# Patient Record
Sex: FEMALE | Race: WHITE | Employment: OTHER | URBAN - METROPOLITAN AREA
[De-identification: names, ages, dates, MRNs, and addresses within clinical notes are randomized per-mention and may not be internally consistent; named-entity substitution may affect disease eponyms.]

---

## 2018-10-01 ENCOUNTER — HOSPITAL ENCOUNTER (OUTPATIENT)
Dept: MAMMOGRAPHY | Age: 68
Discharge: HOME | End: 2018-10-01
Attending: INTERNAL MEDICINE
Payer: MEDICARE

## 2018-10-01 DIAGNOSIS — M81.0 OSTEOPOROSIS, POST-MENOPAUSAL: ICD-10-CM

## 2018-10-01 PROCEDURE — 77080 DXA BONE DENSITY AXIAL: CPT

## 2020-10-02 ENCOUNTER — HOSPITAL ENCOUNTER (OUTPATIENT)
Dept: MAMMOGRAPHY | Age: 70
Discharge: HOME | End: 2020-10-02
Attending: INTERNAL MEDICINE
Payer: MEDICARE

## 2020-10-02 DIAGNOSIS — M85.89 OSTEOPENIA OF MULTIPLE SITES: ICD-10-CM

## 2020-10-02 PROCEDURE — 77080 DXA BONE DENSITY AXIAL: CPT

## 2021-01-28 ENCOUNTER — APPOINTMENT (RX ONLY)
Dept: URBAN - METROPOLITAN AREA CLINIC 23 | Facility: CLINIC | Age: 71
Setting detail: DERMATOLOGY
End: 2021-01-28

## 2021-01-28 DIAGNOSIS — L57.8 OTHER SKIN CHANGES DUE TO CHRONIC EXPOSURE TO NONIONIZING RADIATION: ICD-10-CM

## 2021-01-28 DIAGNOSIS — L30.0 NUMMULAR DERMATITIS: ICD-10-CM | Status: WELL CONTROLLED

## 2021-01-28 DIAGNOSIS — D22 MELANOCYTIC NEVI: ICD-10-CM

## 2021-01-28 DIAGNOSIS — L57.0 ACTINIC KERATOSIS: ICD-10-CM

## 2021-01-28 DIAGNOSIS — L82.0 INFLAMED SEBORRHEIC KERATOSIS: ICD-10-CM

## 2021-01-28 PROBLEM — D22.5 MELANOCYTIC NEVI OF TRUNK: Status: ACTIVE | Noted: 2021-01-28

## 2021-01-28 PROBLEM — D22.39 MELANOCYTIC NEVI OF OTHER PARTS OF FACE: Status: ACTIVE | Noted: 2021-01-28

## 2021-01-28 PROCEDURE — ? OTHER

## 2021-01-28 PROCEDURE — 17000 DESTRUCT PREMALG LESION: CPT | Mod: 59

## 2021-01-28 PROCEDURE — ? COUNSELING

## 2021-01-28 PROCEDURE — 17110 DESTRUCTION B9 LES UP TO 14: CPT

## 2021-01-28 PROCEDURE — ? LIQUID NITROGEN

## 2021-01-28 PROCEDURE — 99203 OFFICE O/P NEW LOW 30 MIN: CPT | Mod: 25

## 2021-01-28 ASSESSMENT — LOCATION SIMPLE DESCRIPTION DERM
LOCATION SIMPLE: CHEST
LOCATION SIMPLE: ABDOMEN
LOCATION SIMPLE: RIGHT CHEEK
LOCATION SIMPLE: LEFT TEMPLE
LOCATION SIMPLE: LEFT UPPER BACK
LOCATION SIMPLE: RIGHT UPPER BACK
LOCATION SIMPLE: LEFT FOREARM
LOCATION SIMPLE: RIGHT ELBOW

## 2021-01-28 ASSESSMENT — LOCATION DETAILED DESCRIPTION DERM
LOCATION DETAILED: MIDDLE STERNUM
LOCATION DETAILED: PERIUMBILICAL SKIN
LOCATION DETAILED: LEFT PROXIMAL DORSAL FOREARM
LOCATION DETAILED: RIGHT SUPERIOR CENTRAL MALAR CHEEK
LOCATION DETAILED: RIGHT ELBOW
LOCATION DETAILED: RIGHT CLAVICULAR SKIN
LOCATION DETAILED: RIGHT CHEEK
LOCATION DETAILED: LEFT MEDIAL TEMPLE
LOCATION DETAILED: RIGHT MID-UPPER BACK
LOCATION DETAILED: LEFT SUPERIOR MEDIAL UPPER BACK

## 2021-01-28 ASSESSMENT — LOCATION ZONE DERM
LOCATION ZONE: FACE
LOCATION ZONE: TRUNK
LOCATION ZONE: ARM

## 2021-01-28 NOTE — PROCEDURE: LIQUID NITROGEN
Detail Level: Detailed
Medical Necessity Clause: This procedure was medically necessary because the lesions that were treated were:irritated
Medical Necessity Information: It is in your best interest to select a reason for this procedure from the list below. All of these items fulfill various CMS LCD requirements except the new and changing color options.
Duration Of Freeze Thaw-Cycle (Seconds): 0
Render Post-Care Instructions In Note?: no
Post-Care Instructions: I reviewed with the patient in detail post-care instructions. Patient is to wear sunprotection, and avoid picking at any of the treated lesions. Pt may apply Vaseline to crusted or scabbing areas.
Consent: The patient's consent was obtained including but not limited to risks of crusting, scabbing, blistering, scarring, darker or lighter pigmentary change, recurrence, incomplete removal and infection.

## 2021-01-28 NOTE — PROCEDURE: OTHER
Note Text (......Xxx Chief Complaint.): This diagnosis correlates with the
Other (Free Text): No flares at this time. No prescription needed
Detail Level: Simple
Render Risk Assessment In Note?: no

## 2021-09-23 ENCOUNTER — APPOINTMENT (RX ONLY)
Dept: URBAN - METROPOLITAN AREA CLINIC 23 | Facility: CLINIC | Age: 71
Setting detail: DERMATOLOGY
End: 2021-09-23

## 2021-09-23 DIAGNOSIS — L82.0 INFLAMED SEBORRHEIC KERATOSIS: ICD-10-CM

## 2021-09-23 PROCEDURE — 17110 DESTRUCTION B9 LES UP TO 14: CPT

## 2021-09-23 PROCEDURE — ? COUNSELING

## 2021-09-23 PROCEDURE — ? LIQUID NITROGEN

## 2021-09-23 ASSESSMENT — LOCATION ZONE DERM: LOCATION ZONE: TRUNK

## 2021-09-23 ASSESSMENT — LOCATION DETAILED DESCRIPTION DERM: LOCATION DETAILED: SUPERIOR THORACIC SPINE

## 2021-09-23 ASSESSMENT — LOCATION SIMPLE DESCRIPTION DERM: LOCATION SIMPLE: UPPER BACK

## 2021-09-23 NOTE — PROCEDURE: LIQUID NITROGEN
Add 52 Modifier (Optional): no
Consent: The patient's consent was obtained including but not limited to risks of crusting, scabbing, blistering, scarring, darker or lighter pigmentary change, recurrence, incomplete removal and infection.
Show Topical Anesthesia Variable?: Yes
Detail Level: Detailed
Post-Care Instructions: I reviewed with the patient in detail post-care instructions. Patient is to wear sunprotection, and avoid picking at any of the treated lesions. Pt may apply Vaseline to crusted or scabbing areas.
Medical Necessity Clause: This procedure was medically necessary because the lesions that were treated were:irritated
Medical Necessity Information: It is in your best interest to select a reason for this procedure from the list below. All of these items fulfill various CMS LCD requirements except the new and changing color options.

## 2022-09-15 ENCOUNTER — APPOINTMENT (RX ONLY)
Dept: URBAN - METROPOLITAN AREA CLINIC 23 | Facility: CLINIC | Age: 72
Setting detail: DERMATOLOGY
End: 2022-09-15

## 2022-09-15 DIAGNOSIS — L82.0 INFLAMED SEBORRHEIC KERATOSIS: ICD-10-CM

## 2022-09-15 DIAGNOSIS — L82.1 OTHER SEBORRHEIC KERATOSIS: ICD-10-CM

## 2022-09-15 PROCEDURE — 11302 SHAVE SKIN LESION 1.1-2.0 CM: CPT

## 2022-09-15 PROCEDURE — ? SHAVE REMOVAL

## 2022-09-15 PROCEDURE — ? COUNSELING

## 2022-09-15 PROCEDURE — 99212 OFFICE O/P EST SF 10 MIN: CPT | Mod: 25

## 2022-09-15 ASSESSMENT — LOCATION DETAILED DESCRIPTION DERM
LOCATION DETAILED: MIDDLE STERNUM
LOCATION DETAILED: UPPER STERNUM

## 2022-09-15 ASSESSMENT — LOCATION ZONE DERM: LOCATION ZONE: TRUNK

## 2022-09-15 ASSESSMENT — LOCATION SIMPLE DESCRIPTION DERM: LOCATION SIMPLE: CHEST

## 2022-09-15 NOTE — PROCEDURE: SHAVE REMOVAL
Render Post-Care Instructions In Note?: no
Was A Bandage Applied: Yes
Notification Instructions: Patient will be notified of pathology results. However, patient instructed to call the office if not contacted within 2 weeks.
Anesthesia Type: 1% lidocaine with epinephrine
Medical Necessity Clause: This procedure was medically necessary because the lesion that was treated was:
Consent was obtained from the patient. The risks and benefits to therapy were discussed in detail. Specifically, the risks of infection, scarring, bleeding, prolonged wound healing, incomplete removal, allergy to anesthesia, nerve injury and recurrence were addressed. Prior to the procedure, the treatment site was clearly identified and confirmed by the patient. All components of Universal Protocol/PAUSE Rule completed.
X Size Of Lesion In Cm (Optional): 0
Wound Care: Petrolatum
Detail Level: Detailed
Post-Care Instructions: I reviewed with the patient in detail post-care instructions. Patient is to keep the biopsy site dry overnight, and then apply bacitracin twice daily until healed. Patient may apply hydrogen peroxide soaks to remove any crusting.
Billing Type: Third-Party Bill
Hemostasis: Drysol
Biopsy Method: Dermablade
Size Of Lesion In Cm (Required): 1.3
Accession #: [id number]
Medical Necessity Information: It is in your best interest to select a reason for this procedure from the list below. All of these items fulfill various CMS LCD requirements except the new and changing color options.

## 2022-10-10 ENCOUNTER — OFFICE VISIT (OUTPATIENT)
Dept: RHEUMATOLOGY | Age: 72
End: 2022-10-10
Payer: MEDICARE

## 2022-10-10 VITALS
DIASTOLIC BLOOD PRESSURE: 69 MMHG | SYSTOLIC BLOOD PRESSURE: 154 MMHG | HEART RATE: 77 BPM | HEIGHT: 62 IN | BODY MASS INDEX: 27.42 KG/M2 | WEIGHT: 149 LBS

## 2022-10-10 DIAGNOSIS — M81.0 AGE-RELATED OSTEOPOROSIS WITHOUT CURRENT PATHOLOGICAL FRACTURE: Primary | ICD-10-CM

## 2022-10-10 DIAGNOSIS — Z79.899 LONG-TERM USE OF HIGH-RISK MEDICATION: ICD-10-CM

## 2022-10-10 PROCEDURE — G8484 FLU IMMUNIZE NO ADMIN: HCPCS | Performed by: INTERNAL MEDICINE

## 2022-10-10 PROCEDURE — 1036F TOBACCO NON-USER: CPT | Performed by: INTERNAL MEDICINE

## 2022-10-10 PROCEDURE — 3017F COLORECTAL CA SCREEN DOC REV: CPT | Performed by: INTERNAL MEDICINE

## 2022-10-10 PROCEDURE — G8427 DOCREV CUR MEDS BY ELIG CLIN: HCPCS | Performed by: INTERNAL MEDICINE

## 2022-10-10 PROCEDURE — G8399 PT W/DXA RESULTS DOCUMENT: HCPCS | Performed by: INTERNAL MEDICINE

## 2022-10-10 PROCEDURE — 99214 OFFICE O/P EST MOD 30 MIN: CPT | Performed by: INTERNAL MEDICINE

## 2022-10-10 PROCEDURE — 1123F ACP DISCUSS/DSCN MKR DOCD: CPT | Performed by: INTERNAL MEDICINE

## 2022-10-10 PROCEDURE — 1090F PRES/ABSN URINE INCON ASSESS: CPT | Performed by: INTERNAL MEDICINE

## 2022-10-10 PROCEDURE — G8417 CALC BMI ABV UP PARAM F/U: HCPCS | Performed by: INTERNAL MEDICINE

## 2022-10-10 ASSESSMENT — PATIENT HEALTH QUESTIONNAIRE - PHQ9
SUM OF ALL RESPONSES TO PHQ QUESTIONS 1-9: 0
2. FEELING DOWN, DEPRESSED OR HOPELESS: 0
SUM OF ALL RESPONSES TO PHQ QUESTIONS 1-9: 0
SUM OF ALL RESPONSES TO PHQ QUESTIONS 1-9: 0
1. LITTLE INTEREST OR PLEASURE IN DOING THINGS: 0
SUM OF ALL RESPONSES TO PHQ QUESTIONS 1-9: 0
SUM OF ALL RESPONSES TO PHQ9 QUESTIONS 1 & 2: 0

## 2022-10-10 ASSESSMENT — ROUTINE ASSESSMENT OF PATIENT INDEX DATA (RAPID3)
ON A SCALE OF ONE TO TEN, HOW MUCH OF A PROBLEM HAS UNUSUAL FATIGUE OR TIREDNESS BEEN FOR YOU OVER THE PAST WEEK?: 0
ON A SCALE OF ONE TO TEN, HOW DIFFICULT WAS IT FOR YOU TO COMPLETE THE LISTED DAILY PHYSICAL TASKS OVER THE LAST WEEK: 0.1
ON A SCALE OF ONE TO TEN, HOW MUCH PAIN HAVE YOU HAD BECAUSE OF YOUR CONDITION OVER THE PAST WEEK?: 0
ON A SCALE OF ONE TO TEN, CONSIDERING ALL THE WAYS IN WHICH ILLNESS AND HEALTH CONDITIONS MAY AFFECT YOU AT THIS TIME, PLEASE INDICATE BELOW HOW YOU ARE DOING:: 2

## 2022-10-10 NOTE — Clinical Note
Patient just had lab work done last week. I reviewed the labs with the patient today. Please schedule her for her next Prolia shot on or after October 20. She will be going for a DEXA scan as well since she is due for it, but either way she needs to get her Prolia shot even if she has not had her DEXA scan yet.

## 2022-10-10 NOTE — PROGRESS NOTES
Anthony Ribera M.D.  [address] [place] [address], [address] [facility]  Office : [phone], Fax: [phone] OFFICE VISIT NOTE  Date of Visit:  10/10/2022 8:43 AM    Patient Information:  Name:  Lucy Zamudio  :  1950  Age:  67 y.o. Gender:  female      Ms. Enrique Us is here today for follow-up of osteoporosis. Last visit: 22      History of Present Illness: On talking to the patient today she states that since she last saw me she has not had any cough or congestion with no fever or chills. She has had minimal joint pain as mentioned below. Since the last visit, patient is feeling \"good\". Pain: 1/10  Location: Some lower back pain. Bilateral shoulder pain. Occasional buckling of the left knee. Quality:  Deep achy pain. Modifying Factors:  Bending forwards worsens the lower back pain. Associated Symptoms:  No tingling, numbness or pain down the arms or legs. DMARD/Biologic 10/10/2022   AM Stiffness None   Pain 0   Fatigue 0   MDHAQ 0.1   Patient Global Score 2   Medication Name Other   Other Medication prolia     Last TB screen: NA  TB result: NA     Current dose of steroids: none  How long on current dose of steroids: NA  How long on continuous steroid therapy: NA     Past DMARDs, if applicable (methotrexate, plaquenil/hydroxychloroquine, sulfasalazine, Arava/leflunomide):none     Past biologics, if applicable (enbrel, humira, simponi, cimzia, xeljanz, orencia, remicade, simponi aria, actemra, rituximab, Karin Kirby, stelara, cosentyx):none     Past NSAIDs, if applicable (motrin, aleve, naproxen, advil, ibuprofen, celebrex, voltaren/diclofenac, etc.):Tylenol prn        Last BMD:10/02/2020   Past osteoporosis drugs, if applicable (fosamax, actonel, boniva, reclast, prolia, forteo): To date, she has had Reclast in , ,  then went on a drug holiday in . You had Reclast #4 on 10/10/16 and Reclast #5 on 17.  Went on Reclast drug holiday from Oct. 2018- Oct. 2020. Currently on prolia - last dose 22. BMI: 27.47  Current exercise regimen, if any: starting back walking , walks a little daily some days more than other. Current vitamin D dose: 3400 international units daily in Vit D+ Multivitamin daily. Current calcium dose: Calcium +D (600 mg once a day and 600 mg in her diet). Fractures since last visit, if any: None     The patient otherwise has no significant interval changes in health or medical history to report. History Reviewed:    Past Medical History  Past Medical History:   Diagnosis Date    Diverticulitis     GERD (gastroesophageal reflux disease)     GERD (gastroesophageal reflux disease) 2013    Osteoporosis 2013    Thyroid disease     hypothroidism       Past Surgical History  Past Surgical History:   Procedure Laterality Date    HEENT      sinus surgery x 2    HYSTERECTOMY (CERVIX STATUS UNKNOWN)         Family History  Family History   Problem Relation Age of Onset    Stroke Mother     Hypertension Father     Diabetes Father     Diabetes Sister     Diabetes Sister     Cancer Mother        Social History  Social History     Socioeconomic History    Marital status:      Spouse name: None    Number of children: None    Years of education: None    Highest education level: None   Tobacco Use    Smoking status: Former     Packs/day: 0.75     Types: Cigarettes     Start date: 1968     Quit date: 1971     Years since quittin.8    Smokeless tobacco: Never   Substance and Sexual Activity    Alcohol use: Yes    Drug use:  No               Allergy:  Allergies   Allergen Reactions    Amoxicillin-Pot Clavulanate Diarrhea    Ciprofloxacin Diarrhea and Rash         Current Medications:  Outpatient Encounter Medications as of 10/10/2022   Medication Sig Dispense Refill    atorvastatin (LIPITOR) 20 MG tablet Take by mouth daily      Cholecalciferol 50 MCG (2000) CAPS Take 1 capsule by mouth daily      denosumab (PROLIA) 60 MG/ML SOSY SC injection Inject 60 mg into the skin      esomeprazole (NEXIUM) 40 MG delayed release capsule Take 40 mg by mouth daily      fexofenadine (ALLEGRA) 180 MG tablet Take by mouth daily      levothyroxine (SYNTHROID) 50 MCG tablet Take 50 mcg by mouth every other day      levothyroxine (SYNTHROID) 75 MCG tablet Take 75 mcg by mouth every other day      Probiotic Product (ACIDOPHILUS PROBIOTIC) CAPS capsule Take 1 capsule by mouth daily      SUMAtriptan (IMITREX) 100 MG tablet Take 100 mg by mouth once as needed      topiramate (TOPAMAX) 100 MG tablet Take 1.5 bid       No facility-administered encounter medications on file as of 10/10/2022. REVIEW OF SYSTEMS: The following systems were reviewed with patient today and were negative except for the following (depicted with an \"X\"):        \"X\" General  \"X\" Head and Neck  \"X\" Heart and Breathing  \"X\" Gastrointestinal    Fever/chills   Hair loss   Shortness of breath   Upset stomach    Falls   Dry mouth   Coughing   Diarrhea / constipation    Wt loss   Mouth sores   Wheezing   Heartburn    Wt gain   Ringing ears   Chest pain   Dark or bloody stools    Night sweats   Diff. swallowing  X None of above   Nausea or vomiting   X None of above  X None of above     X None of above                \"X\" Skin  \"X\" Neurology  \"X\" Urinary/Gyn  \"X\" Other    Easy bruising   Numbness/ tingling   Female problems   Depression    Rashes   Weakness   Problems with urination   Feeling anxious    Sun sensitivity   Headaches  X None of above   Problems sleeping   X None of above  X None of above     X None of above          Physical Exam:  Blood pressure (!) 154/69, pulse 77, height 5' 2\" (1.575 m), weight 149 lb (67.6 kg). General:  Patient alert, cooperative and in no apparent distress. HEENT: Pupils equally reactive to light and accommodation, minimal scleral injection noted.   Heart: Regular rate and rhythm, normal S1 and S2, no rubs or gallops. Lungs: Clear to auscultation bilaterally. Abdomen: Soft, nontender, no hepatosplenomegaly. Skin:  No rashes. No nail abnormalities. Neurologic:  Oriented, normal speech and affect. Normal gait. Extremities:  No edema in bilateral lower extremities with no cyanosis or clubbing. Muskoskeletal Exam:     I examined the shoulders, elbows, wrists, MCPs, PIPs, DIPs and knees bilaterally for strength, range of motion, deformity, tenderness, swelling, and synovitis. The findings are:  No joint tenderness with no synovitis of the 2nd to 5th MCP, PIP or DIP joints. No synovitis with no tenderness of the wrists on the dorsum with no warmth or redness. Intact ROM of the wrists to flexion and extension. No tenderness of the elbows with no swelling, warmth or redness with intact ROM to flexion and extension. No tenderness of the shoulders anteriorly or superiorly with intact ROM to abduction and internal rotation. No tenderness of the C spine with no tenderness of the para spinal muscles of the neck. No tenderness of the T and L spine with no SI joint tenderness. No mid joint line tenderness of the knees with no swelling, warmth or redness. No tenderness with no synovitis of the ankles with no warmth or redness. No metatarsalgia with no synovitis of the 1st to 5th MTP joints of the feet with no warmth or redness. Patient otherwise has a normal joint exam without other evidence of joint tenderness, synovitis, warmth, erythema, decreased ROM, weakness or deformities. Radiology Reports Reviewed (if available):  Last 3 months  [unfilled]    Lab Reports Reviewed (if available): Last 3 months    No visits with results within 3 Month(s) from this visit.    Latest known visit with results is:   Office Visit on 04/06/2021   Component Date Value Ref Range Status    Glucose 04/06/2021 93  65 - 99 mg/dL Final    BUN 04/06/2021 14  8 - 27 mg/dL Final    Creatinine 04/06/2021 0.96  0.57 - 1.00 mg/dL Final    EGFR IF NonAfrican American 04/06/2021 60  >59 mL/min/1.73 Final    GFR  04/06/2021 69  >59 mL/min/1.73 Final    Bun/Cre Ratio 04/06/2021 15  12 - 28 NA Final    Sodium 04/06/2021 144  134 - 144 mmol/L Final    Potassium 04/06/2021 4.0  3.5 - 5.2 mmol/L Final    Chloride 04/06/2021 111 (A)  96 - 106 mmol/L Final    CO2 04/06/2021 19 (A)  20 - 29 mmol/L Final    Calcium 04/06/2021 9.1  8.7 - 10.3 mg/dL Final    Total Protein 04/06/2021 6.4  6.0 - 8.5 g/dL Final    Albumin 04/06/2021 4.3  3.7 - 4.7 g/dL Final    Globulin, Total 04/06/2021 2.1  1.5 - 4.5 g/dL Final    Albumin/Globulin Ratio 04/06/2021 2.0  1.2 - 2.2 NA Final    Total Bilirubin 04/06/2021 0.3  0.0 - 1.2 mg/dL Final    Alkaline Phosphatase 04/06/2021 62  39 - 117 IU/L Final    AST 04/06/2021 20  0 - 40 IU/L Final    ALT 04/06/2021 25  0 - 32 IU/L Final         The results above were reviewed and discussed with patient. Assessment/Plan:   Komal Rangel is a 67 y.o. female who presents with:     Age-related osteoporosis without current pathological fracture:  Since her last DEXA scan was done 10/2/2020 I did put in an order for her to have a repeat DEXA scan before she sees me back in 6 months time. We will get her in for her next Prolia shot on or after October 20 since her last Prolia shot was 04/20/2022. She was instructed to continue calcium 600 mg once a day in the supplement form and 600 mg in her diet while continuing vitamin D 3400 IUs daily along with a multivitamin daily.  -     DEXA BONE DENSITY AXIAL SKELETON; Future     Long-term use of high-risk medication: Since patient did have a vitamin D 25-hydroxy level as well as a BMP and LFT's checked on 10/7/2022 I did not feel the need to repeat those labs today. Disease activity plan:  As stated above. Steroid management plan:  As stated above, if applicable. Pain management plan:  As stated above, if applicable.     Weight management plan:  Weight loss through diet and exercise is always encouraged    Disease prognosis: Good    I appreciate the opportunity to continue to participate in the care of this patient. Follow-up and Dispositions    Return in about 22 weeks (around 3/13/2023). Electronically signed by:  Mariela Cuevas MD      This note was dictated using dragon voice recognition software.   It has been proofread, but there may still exist voice recognition errors that the author did not detect.                --------------------------------------------------------------------------------------------------------------------------------------------------------------------------------------------------------------------------------

## 2022-10-12 ENCOUNTER — TELEPHONE (OUTPATIENT)
Dept: RHEUMATOLOGY | Age: 72
End: 2022-10-12

## 2022-10-13 NOTE — TELEPHONE ENCOUNTER
----- Message from Gilma Malagon MA sent at 10/10/2022  9:12 AM EDT -----  Regarding: MD Gilma Molina MA    Patient just had lab work done last week. Laura Pagan reviewed the labs with the patient today.  Please schedule her for her next Prolia shot on or after October 20.  She will be going for a DEXA scan as well since she is due for it, but either way she needs to get her Prolia shot even if she has not had her DEXA scan yet.

## 2022-10-13 NOTE — TELEPHONE ENCOUNTER
Prolia BIF submitted 10/12/22. MCR and [employer] MCR supp plan D.     Labs 10/7/22. Vit D= 66  Calcium= 8.9  Creatnine 1.11, GFR 52. Patient due for Prolia 10/20/22.

## 2022-10-13 NOTE — TELEPHONE ENCOUNTER
PHONE CALL to patient. Scheduled her for 10/24/22 10:00.      Lab Results   Component Value Date     04/06/2021    K 4.0 04/06/2021     (H) 04/06/2021    CO2 19 (L) 04/06/2021    BUN 14 04/06/2021    CREATININE 0.96 04/06/2021    GLUCOSE 93 04/06/2021    CALCIUM 9.1 04/06/2021    PROT 6.4 04/06/2021    LABALBU 4.3 04/06/2021    BILITOT 0.3 04/06/2021    ALKPHOS 62 04/06/2021    AST 20 04/06/2021    ALT 25 04/06/2021    GFRAA 69 04/06/2021    AGRATIO 2.0 04/06/2021     Vitamin D 10/7/22 = 66

## 2022-10-27 ENCOUNTER — HOSPITAL ENCOUNTER (OUTPATIENT)
Dept: MAMMOGRAPHY | Age: 72
Discharge: HOME | End: 2022-10-30
Payer: MEDICARE

## 2022-10-27 ENCOUNTER — NURSE ONLY (OUTPATIENT)
Dept: RHEUMATOLOGY | Age: 72
End: 2022-10-27
Payer: MEDICARE

## 2022-10-27 VITALS — HEART RATE: 60 BPM | SYSTOLIC BLOOD PRESSURE: 124 MMHG | TEMPERATURE: 97 F | DIASTOLIC BLOOD PRESSURE: 63 MMHG

## 2022-10-27 DIAGNOSIS — M81.0 AGE-RELATED OSTEOPOROSIS WITHOUT CURRENT PATHOLOGICAL FRACTURE: ICD-10-CM

## 2022-10-27 DIAGNOSIS — M81.0 AGE-RELATED OSTEOPOROSIS WITHOUT CURRENT PATHOLOGICAL FRACTURE: Primary | ICD-10-CM

## 2022-10-27 PROCEDURE — 77080 DXA BONE DENSITY AXIAL: CPT

## 2022-10-27 PROCEDURE — 96372 THER/PROPH/DIAG INJ SC/IM: CPT | Performed by: INTERNAL MEDICINE

## 2022-10-27 NOTE — PROGRESS NOTES
[address] [address], [address]  Office : [phone], Fax: [phone]       # 5 Prolia 60mg/ml injected SC today into left upper arm. Patient tolerated injection well. Advised patient to continue with calcium and vitamin D post injection. Advised patient to call with any problems post injection. Medication ordered by Dr. Errol Davis. Pre-infusion/injection questionairre for osteoporosis    1. Have you had or are you planning any dental work? NO    2. Are you taking your calcium and vitamin D? YES    3. When was your last osteoporosis treatment? 4/20/22    4. Have you had any recent fractures?  NO

## 2023-02-27 RX ORDER — TOPIRAMATE 100 MG/1
TABLET, FILM COATED ORAL
Qty: 270 TABLET | Refills: 3 | OUTPATIENT
Start: 2023-02-27

## 2023-03-08 ENCOUNTER — OFFICE VISIT (OUTPATIENT)
Dept: NEUROLOGY | Age: 73
End: 2023-03-08
Payer: MEDICARE

## 2023-03-08 VITALS
DIASTOLIC BLOOD PRESSURE: 78 MMHG | BODY MASS INDEX: 27.42 KG/M2 | WEIGHT: 149 LBS | SYSTOLIC BLOOD PRESSURE: 132 MMHG | OXYGEN SATURATION: 100 % | HEART RATE: 65 BPM | HEIGHT: 62 IN

## 2023-03-08 DIAGNOSIS — G43.709 CHRONIC MIGRAINE WITHOUT AURA WITHOUT STATUS MIGRAINOSUS, NOT INTRACTABLE: Primary | ICD-10-CM

## 2023-03-08 PROCEDURE — G8417 CALC BMI ABV UP PARAM F/U: HCPCS | Performed by: PSYCHIATRY & NEUROLOGY

## 2023-03-08 PROCEDURE — G8399 PT W/DXA RESULTS DOCUMENT: HCPCS | Performed by: PSYCHIATRY & NEUROLOGY

## 2023-03-08 PROCEDURE — 1090F PRES/ABSN URINE INCON ASSESS: CPT | Performed by: PSYCHIATRY & NEUROLOGY

## 2023-03-08 PROCEDURE — G8427 DOCREV CUR MEDS BY ELIG CLIN: HCPCS | Performed by: PSYCHIATRY & NEUROLOGY

## 2023-03-08 PROCEDURE — 1123F ACP DISCUSS/DSCN MKR DOCD: CPT | Performed by: PSYCHIATRY & NEUROLOGY

## 2023-03-08 PROCEDURE — G8484 FLU IMMUNIZE NO ADMIN: HCPCS | Performed by: PSYCHIATRY & NEUROLOGY

## 2023-03-08 PROCEDURE — 1036F TOBACCO NON-USER: CPT | Performed by: PSYCHIATRY & NEUROLOGY

## 2023-03-08 PROCEDURE — 99214 OFFICE O/P EST MOD 30 MIN: CPT | Performed by: PSYCHIATRY & NEUROLOGY

## 2023-03-08 PROCEDURE — 3017F COLORECTAL CA SCREEN DOC REV: CPT | Performed by: PSYCHIATRY & NEUROLOGY

## 2023-03-08 RX ORDER — CALCIUM CARBONATE/VITAMIN D3 600 MG-10
1 TABLET ORAL 2 TIMES DAILY
COMMUNITY
Start: 2012-11-08

## 2023-03-08 RX ORDER — TOPIRAMATE 100 MG/1
TABLET, FILM COATED ORAL
Qty: 270 TABLET | Refills: 3 | Status: SHIPPED | OUTPATIENT
Start: 2023-03-08

## 2023-03-08 RX ORDER — CYCLOSPORINE 0.5 MG/ML
EMULSION OPHTHALMIC
COMMUNITY
Start: 2022-12-02

## 2023-03-08 RX ORDER — SUMATRIPTAN 100 MG/1
TABLET, FILM COATED ORAL
Qty: 27 TABLET | Refills: 3 | Status: SHIPPED | OUTPATIENT
Start: 2023-03-08

## 2023-03-08 ASSESSMENT — ENCOUNTER SYMPTOMS
EYES NEGATIVE: 1
ALLERGIC/IMMUNOLOGIC NEGATIVE: 1
RESPIRATORY NEGATIVE: 1
GASTROINTESTINAL NEGATIVE: 1

## 2023-03-08 NOTE — PROGRESS NOTES
[facility], [address] [place] [address], [address] [place] [address]  Phone: [phone] Fax [phone]  Dr. Randye Nyhan      3/8/2023  Radha Cramer     Patient is referred by the following provider for consultation regarding as below:       I reviewed the available records and notes and have examined patient with the following findings:     Chief Complaint:  Chief Complaint   Patient presents with    Follow-up     Chronic migraine without aura, not intractable, without status migrainosus          HPI: This is a right handed 67 y.o.  female who is very pleasant very appropriate patient unfortunately does suffer from chronic migraine headaches. Her headaches actually improved dramatically once unfortunately her  passed away of cancer because she was under an enormous amount of stress at that time tried to take care of him so when the stress level decreased her headaches improved. She had a very good year but recovered get the allergies this season that makes it usually little worse. But she has not had any overly severe migraine headaches in a year. When she does get them there is pressure pounding throbbing photophobia phonophobia nausea but no vomiting. Usually in the frontal head regions. She has not noticed any memory or cognition or side effects from the Topamax but we have been watching her closely. She is doing well overall when she does take the Imitrex 100 mg tablets she actually breaks it into1/4 or half tablets when she takes it. And then she will take about 3 or 4 different in a row. Equaling up to 1 full tablet. IMAGING REVIEW:  I REVIEWED PERTINENT  IMAGES AND REPORTS WITH THE PATIENT PERSONALLY, DIRECTLY AND FULLY.      Past Medical History:  Past Medical History:   Diagnosis Date    Diverticulitis     GERD (gastroesophageal reflux disease)     GERD (gastroesophageal reflux disease) 8/1/2013    Osteoporosis 8/1/2013    Thyroid disease     hypothroidism       Past Surgical History:  Past Surgical History:   Procedure Laterality Date    HEENT      sinus surgery x 2    HYSTERECTOMY (CERVIX STATUS UNKNOWN)         Social History:  Social History     Socioeconomic History    Marital status:       Spouse name: Not on file    Number of children: Not on file    Years of education: Not on file    Highest education level: Not on file   Occupational History    Not on file   Tobacco Use    Smoking status: Former     Packs/day: 0.75     Types: Cigarettes     Start date: 1968     Quit date: 1971     Years since quittin.2    Smokeless tobacco: Never   Substance and Sexual Activity    Alcohol use: Yes    Drug use: No    Sexual activity: Not on file   Other Topics Concern    Not on file   Social History Narrative    Not on file     Social Determinants of Health     Financial Resource Strain: Not on file   Food Insecurity: Not on file   Transportation Needs: Not on file   Physical Activity: Not on file   Stress: Not on file   Social Connections: Not on file   Intimate Partner Violence: Not on file   Housing Stability: Not on file       Family History:   Family History   Problem Relation Age of Onset    Stroke Mother     Hypertension Father     Diabetes Father     Diabetes Sister     Diabetes Sister     Cancer Mother        Current Outpatient Medications on File Prior to Visit   Medication Sig Dispense Refill    calcium carb-cholecalciferol 600-10 MG-MCG TABS per tab Take 1 tablet by mouth 2 times daily      RESTASIS 0.05 % ophthalmic emulsion       mirabegron (MYRBETRIQ) 25 MG TB24 Take by mouth daily      atorvastatin (LIPITOR) 20 MG tablet Take by mouth daily      Cholecalciferol 50 MCG (2000) CAPS Take 1 capsule by mouth daily      denosumab (PROLIA) 60 MG/ML SOSY SC injection Inject 60 mg into the skin      esomeprazole (NEXIUM) 40 MG delayed release capsule Take 40 mg by mouth daily      fexofenadine (ALLEGRA) 180 MG tablet Take by mouth daily      levothyroxine (SYNTHROID) 50 MCG tablet Take 50 mcg by mouth every other day      levothyroxine (SYNTHROID) 75 MCG tablet Take 75 mcg by mouth every other day      Probiotic Product (ACIDOPHILUS PROBIOTIC) CAPS capsule Take 1 capsule by mouth daily      SUMAtriptan (IMITREX) 100 MG tablet Take 100 mg by mouth once as needed      topiramate (TOPAMAX) 100 MG tablet Take 1.5 bid       No current facility-administered medications on file prior to visit. Allergies   Allergen Reactions    Ciprofloxacin Diarrhea and Rash     Other reaction(s): Rash-Allergy  Other reaction(s): rash, diarrhea    Amoxicillin-Pot Clavulanate Diarrhea     Other reaction(s): Diarrhea-Intolerance       Review of Systems:  Review of Systems   Constitutional: Negative. HENT: Negative. Eyes: Negative. Respiratory: Negative. Cardiovascular: Negative. Gastrointestinal: Negative. Endocrine: Negative. Genitourinary: Negative. Musculoskeletal: Negative. Skin: Negative. Allergic/Immunologic: Negative. Neurological:  Positive for headaches. Hematological: Negative. Psychiatric/Behavioral: Negative. No flowsheet data found. No flowsheet data found. There were no vitals filed for this visit. Physical Exam  Constitutional:       Appearance: Normal appearance. HENT:      Head: Normocephalic and atraumatic. Eyes:      Extraocular Movements: Extraocular movements intact and EOM normal.      Pupils: Pupils are equal, round, and reactive to light. Cardiovascular:      Rate and Rhythm: Normal rate and regular rhythm. Pulses: Normal pulses. Pulmonary:      Effort: Pulmonary effort is normal.   Abdominal:      Palpations: Abdomen is soft. Neurological:      Mental Status: She is alert and oriented to person, place, and time. Gait: Gait is intact. Deep Tendon Reflexes:      Reflex Scores:       Tricep reflexes are 1+ on the right side and 1+ on the left side.        Bicep reflexes are 1+ on the right side and 1+ on the left side. Brachioradialis reflexes are 1+ on the right side and 1+ on the left side. Patellar reflexes are 1+ on the right side and 1+ on the left side. Achilles reflexes are 1+ on the right side and 1+ on the left side. Neurologic Exam     Mental Status   Oriented to person, place, and time. Attention: normal. Concentration: normal.   Level of consciousness: alert  Knowledge: good. Cranial Nerves     CN II   Visual fields full to confrontation. CN III, IV, VI   Pupils are equal, round, and reactive to light. Extraocular motions are normal.     CN VII   Facial expression full, symmetric. Motor Exam   Right arm tone: normal  Left arm tone: normal  Right leg tone: normal  Left leg tone: normal    Gait, Coordination, and Reflexes     Gait  Gait: normal    Tremor   Resting tremor: absent  Intention tremor: absent  Action tremor: absent    Reflexes   Right brachioradialis: 1+  Left brachioradialis: 1+  Right biceps: 1+  Left biceps: 1+  Right triceps: 1+  Left triceps: 1+  Right patellar: 1+  Left patellar: 1+  Right achilles: 1+  Left achilles: 1+        Assessment   Assessment / Plan:    Magdi Eden was seen today for follow-up. Diagnoses and all orders for this visit:    Chronic migraine without aura without status migrainosus, not intractable the patient had a very good year last year and no severe migraine headaches. She has had some minor ones they do come in clusters but overall has been doing very well when she does use Imitrex she breaks it into1/4 pieces or half pieces so she is not taking the full 100 mg. On top that she is taking Topamax 100 mg 1-1/2 twice a day and working to continue this. The Diagnosis and differential diagnostic considerations, and Rx Tx were reviewed with the patient at length. No orders of the defined types were placed in this encounter.          I have spent greater than 50% of visit discussing and counseling of patient  for treatment and diagnostic plan review. Total time30     . Notes: Patient is to continue all medications as directed by prescribing physicians. Continuations on today's visit are made based on the patient's report of current medications.              Dr. Treasure Mclaughlin  Consultation Neurology, Neurodiagnostics and Neurotherapeutics  Neuroelectrophysiology, EEG, EMG  [facility]  [address] [place] [address], [address] [place] [address]  Phone:  [phone]  Fax:   [phone]

## 2023-03-13 ENCOUNTER — OFFICE VISIT (OUTPATIENT)
Dept: RHEUMATOLOGY | Age: 73
End: 2023-03-13
Payer: MEDICARE

## 2023-03-13 VITALS
SYSTOLIC BLOOD PRESSURE: 133 MMHG | BODY MASS INDEX: 27.38 KG/M2 | HEIGHT: 62 IN | DIASTOLIC BLOOD PRESSURE: 73 MMHG | WEIGHT: 148.8 LBS | HEART RATE: 65 BPM

## 2023-03-13 DIAGNOSIS — M81.0 AGE-RELATED OSTEOPOROSIS WITHOUT CURRENT PATHOLOGICAL FRACTURE: Primary | ICD-10-CM

## 2023-03-13 DIAGNOSIS — Z79.899 LONG-TERM USE OF HIGH-RISK MEDICATION: ICD-10-CM

## 2023-03-13 LAB
ALBUMIN SERPL-MCNC: 3.6 G/DL (ref 3.2–4.6)
ALBUMIN/GLOB SERPL: 1.2 (ref 0.4–1.6)
ALP SERPL-CCNC: 61 U/L (ref 50–136)
ALT SERPL-CCNC: 58 U/L (ref 12–65)
ANION GAP SERPL CALC-SCNC: 2 MMOL/L (ref 2–11)
AST SERPL-CCNC: 27 U/L (ref 15–37)
BILIRUB SERPL-MCNC: 0.3 MG/DL (ref 0.2–1.1)
BUN SERPL-MCNC: 13 MG/DL (ref 8–23)
CALCIUM SERPL-MCNC: 9.2 MG/DL (ref 8.3–10.4)
CHLORIDE SERPL-SCNC: 118 MMOL/L (ref 101–110)
CO2 SERPL-SCNC: 24 MMOL/L (ref 21–32)
CREAT SERPL-MCNC: 0.9 MG/DL (ref 0.6–1)
GLOBULIN SER CALC-MCNC: 3.1 G/DL (ref 2.8–4.5)
GLUCOSE SERPL-MCNC: 93 MG/DL (ref 65–100)
POTASSIUM SERPL-SCNC: 4 MMOL/L (ref 3.5–5.1)
PROT SERPL-MCNC: 6.7 G/DL (ref 6.3–8.2)
SODIUM SERPL-SCNC: 144 MMOL/L (ref 133–143)

## 2023-03-13 PROCEDURE — G8399 PT W/DXA RESULTS DOCUMENT: HCPCS | Performed by: INTERNAL MEDICINE

## 2023-03-13 PROCEDURE — 99214 OFFICE O/P EST MOD 30 MIN: CPT | Performed by: INTERNAL MEDICINE

## 2023-03-13 PROCEDURE — 3017F COLORECTAL CA SCREEN DOC REV: CPT | Performed by: INTERNAL MEDICINE

## 2023-03-13 PROCEDURE — G8427 DOCREV CUR MEDS BY ELIG CLIN: HCPCS | Performed by: INTERNAL MEDICINE

## 2023-03-13 PROCEDURE — 1090F PRES/ABSN URINE INCON ASSESS: CPT | Performed by: INTERNAL MEDICINE

## 2023-03-13 PROCEDURE — 1036F TOBACCO NON-USER: CPT | Performed by: INTERNAL MEDICINE

## 2023-03-13 PROCEDURE — 1123F ACP DISCUSS/DSCN MKR DOCD: CPT | Performed by: INTERNAL MEDICINE

## 2023-03-13 PROCEDURE — G8417 CALC BMI ABV UP PARAM F/U: HCPCS | Performed by: INTERNAL MEDICINE

## 2023-03-13 PROCEDURE — G8484 FLU IMMUNIZE NO ADMIN: HCPCS | Performed by: INTERNAL MEDICINE

## 2023-03-13 ASSESSMENT — ROUTINE ASSESSMENT OF PATIENT INDEX DATA (RAPID3)
ON A SCALE OF ONE TO TEN, HOW MUCH OF A PROBLEM HAS UNUSUAL FATIGUE OR TIREDNESS BEEN FOR YOU OVER THE PAST WEEK?: 0
ON A SCALE OF ONE TO TEN, HOW DIFFICULT WAS IT FOR YOU TO COMPLETE THE LISTED DAILY PHYSICAL TASKS OVER THE LAST WEEK: 0.0
WHEN YOU AWAKENED IN THE MORNING OVER THE LAST WEEK, PLEASE INDICATE THE AMOUNT OF TIME IT TAKES UNTIL YOU ARE AS LIMBER AS YOU WILL BE FOR THE DAY: < 10 MIN
ON A SCALE OF ONE TO TEN, CONSIDERING ALL THE WAYS IN WHICH ILLNESS AND HEALTH CONDITIONS MAY AFFECT YOU AT THIS TIME, PLEASE INDICATE BELOW HOW YOU ARE DOING:: 0
ON A SCALE OF ONE TO TEN, HOW MUCH PAIN HAVE YOU HAD BECAUSE OF YOUR CONDITION OVER THE PAST WEEK?: 0

## 2023-03-13 NOTE — PROGRESS NOTES
Tamika Whitaker M.D.  [facility] [address] [facility], [facility] [place]  Office : [phone], Fax: [phone] OFFICE VISIT NOTE  Date of Visit:  3/13/2023 12:08 PM    Patient Information:  Name:  Charo Boudreaux  :  1950  Age:  67 y.o. Gender:  female      Ms. Dario Orozco is here today for follow-up of osteoporosis. Last visit: 10/10/2022      History of Present Illness: On talking to the patient today she states that she has had a dry cough with some left ear pain with no drainage. She denies any fever or chills with no associated shortness of breath either. Denies any pain with swallowing. Her last Prolia shot was administered 10/27/22 and hence she will be due for her next Prolia shot on or after 2023. Her current joint complaints are minimal to none as mentioned below. Since the last visit, patient is feeling \"good\". Pain: 0/10  Location: Some lower back pain. Quality:  Deep achy pain. Modifying Factors:  Bending forward motion worsens the lower back pain. Associated Symptoms:  No tingling, numbness or pain down the arms or legs. No UE or LE weakness. No limitations with her ADL's.      DMARD/Biologic 3/13/2023   AM Stiffness < 10 min   Pain 0   Fatigue 0   MDHAQ 0.0   Patient Global Score 0   Medication Name Other   Other Medication prolia     Last TB screen: NA  TB result: NA     Current dose of steroids: none  How long on current dose of steroids: NA  How long on continuous steroid therapy: NA     Past DMARDs, if applicable (methotrexate, plaquenil/hydroxychloroquine, sulfasalazine, Arava/leflunomide):none     Past biologics, if applicable (enbrel, humira, simponi, cimzia, xeljanz, orencia, remicade, simponi aria, actemra, rituximab, Leanora Plum, stelara, cosentyx):none     Past NSAIDs, if applicable (motrin, aleve, naproxen, advil, ibuprofen, celebrex, voltaren/diclofenac, etc.):Tylenol prn        Last BMD:10/27/2022       INDICATION: Postmenopausal Female screening and follow-up osteoporosis. Family   history of osteoporosis and hip fracture. Patient takes calcium and vitamin D   supplements, Reclast osteoporosis medication. COMPARISON: 10/2/2020       TECHNIQUE: Imaging was performed on a [employer]. World Health   Organization meta-analysis fracture risk calculator (FRAX) analysis was   performed for 10 year fracture risk probability assessment       FINDINGS:       LUMBAR SPINE:     Bone mineral density (gm/cm2):  0.954 (previously 0.969)   T-score:  -1.9   Z-score:  -0.2       LEFT FEMORAL NECK:     Bone mineral density (gm/cm2):  0.748 (previously 0.683)   T-score:  -2.1   Z-score:  -0.3                Impression   Using the World Health Organization criteria, the bone mineral   density is now osteopenia. Compared to previous exam the bone density has increased by 4.6% in the hips and   has not significantly changed in the lumbar spine. 10 year probability of major osteoporotic fracture:  22.3%   10 year probability of hip fracture:  10.7%     Past osteoporosis drugs, if applicable (fosamax, actonel, boniva, reclast, prolia, forteo): To date, she has had Reclast in 2010, 2011, 2012 then went on a drug holiday in 2013. You had Reclast #4 on 10/10/16 and Reclast #5 on 11/7/17. Went on Reclast drug holiday from Oct. 2018- Oct. 2020. Currently on prolia - last dose 10/27//22. BMI: 27.22  Current exercise regimen, if any: starting back walking , walks a little daily some days more than other. Current vitamin D dose: 3400 international units daily in Vit D+ Multivitamin daily. Current calcium dose: Calcium +D (600 mg once a day and 600 mg in her diet). Fractures since last visit, if any: None    The patient otherwise has no significant interval changes in health or medical history to report.      History Reviewed:    Past Medical History  Past Medical History:   Diagnosis Date Diverticulitis     GERD (gastroesophageal reflux disease)     GERD (gastroesophageal reflux disease) 2013    Osteoporosis 2013    Thyroid disease     hypothroidism       Past Surgical History  Past Surgical History:   Procedure Laterality Date    HEENT      sinus surgery x 2    HYSTERECTOMY (CERVIX STATUS UNKNOWN)         Family History  Family History   Problem Relation Age of Onset    Stroke Mother     Hypertension Father     Diabetes Father     Diabetes Sister     Diabetes Sister     Cancer Mother        Social History  Social History     Socioeconomic History    Marital status:      Spouse name: None    Number of children: None    Years of education: None    Highest education level: None   Tobacco Use    Smoking status: Former     Packs/day: 0.75     Types: Cigarettes     Start date: 1968     Quit date: 1971     Years since quittin.2    Smokeless tobacco: Never   Substance and Sexual Activity    Alcohol use: Yes    Drug use: No               Allergy:  Allergies   Allergen Reactions    Ciprofloxacin Diarrhea and Rash     Other reaction(s): Rash-Allergy  Other reaction(s): rash, diarrhea    Amoxicillin-Pot Clavulanate Diarrhea     Other reaction(s): Diarrhea-Intolerance         Current Medications:  Outpatient Encounter Medications as of 3/13/2023   Medication Sig Dispense Refill    calcium carb-cholecalciferol 600-10 MG-MCG TABS per tab Take 1 tablet by mouth 2 times daily      RESTASIS 0.05 % ophthalmic emulsion       mirabegron (MYRBETRIQ) 25 MG TB24 Take by mouth daily      topiramate (TOPAMAX) 100 MG tablet Take 1.5 bid 270 tablet 3    SUMAtriptan (IMITREX) 100 MG tablet Take 1 at the onset of your headache and you can repeat in 2 hours.  27 tablet 3    atorvastatin (LIPITOR) 20 MG tablet Take by mouth daily      Cholecalciferol 50 MCG (2000 UT) CAPS Take 1 capsule by mouth daily      denosumab (PROLIA) 60 MG/ML SOSY SC injection Inject 60 mg into the skin      esomeprazole (NEXIUM) 40 MG delayed release capsule Take 40 mg by mouth daily      fexofenadine (ALLEGRA) 180 MG tablet Take by mouth daily      levothyroxine (SYNTHROID) 50 MCG tablet Take 50 mcg by mouth every other day      levothyroxine (SYNTHROID) 75 MCG tablet Take 75 mcg by mouth every other day      Probiotic Product (ACIDOPHILUS PROBIOTIC) CAPS capsule Take 1 capsule by mouth daily       No facility-administered encounter medications on file as of 3/13/2023. REVIEW OF SYSTEMS: The following systems were reviewed with patient today and were negative except for the following (depicted with an \"X\"):        \"X\" General  \"X\" Head and Neck  \"X\" Heart and Breathing  \"X\" Gastrointestinal    Fever/chills   Hair loss   Shortness of breath   Upset stomach    Falls   Dry mouth   Coughing   Diarrhea / constipation    Wt loss   Mouth sores   Wheezing   Heartburn    Wt gain   Ringing ears   Chest pain   Dark or bloody stools    Night sweats   Diff. swallowing  X None of above   Nausea or vomiting   X None of above  X None of above     X None of above                \"X\" Skin  \"X\" Neurology  \"X\" Urinary/Gyn  \"X\" Other    Easy bruising   Numbness/ tingling   Female problems   Depression    Rashes   Weakness   Problems with urination   Feeling anxious    Sun sensitivity   Headaches  X None of above   Problems sleeping   X None of above  X None of above     X None of above          Physical Exam:  Blood pressure 133/73, pulse 65, height 5' 2\" (1.575 m), weight 148 lb 12.8 oz (67.5 kg). General:  Patient alert, cooperative and in no apparent distress. HEENT: Pupils equally reactive to light and accommodation, no scleral injection noted. Heart: Regular rate and rhythm, normal S1 and S2, no rubs or gallops. Lungs: Clear to auscultation bilaterally. Abdomen: Soft, nontender, no hepatosplenomegaly. Skin:  No rashes. No nail abnormalities. Neurologic:  Oriented, normal speech and affect. Normal gait.     Extremities:  No edema in bilateral lower extremities with no cyanosis or clubbing. Muskoskeletal Exam:     I examined the shoulders, elbows, wrists, MCPs, PIPs, DIPs and knees bilaterally for strength, range of motion, deformity, tenderness, swelling, and synovitis. The findings are:  No joint tenderness with no synovitis of the 2nd to 5th MCP, PIP or DIP joints. No synovitis with no tenderness of the wrists on the dorsum with no warmth or redness. Intact ROM of the wrists to flexion and extension. No tenderness of the elbows with no swelling, warmth or redness with intact ROM to flexion and extension. No tenderness of the shoulders anteriorly or superiorly with intact ROM to abduction and internal rotation. No tenderness of the C spine with no tenderness of the para spinal muscles of the neck. No tenderness of the T and L spine with no SI joint tenderness. No mid joint line tenderness of the knees with no swelling, warmth or redness. No tenderness with no synovitis of the ankles with no warmth or redness. No metatarsalgia with no synovitis of the 1st to 5th MTP joints of the feet with no warmth or redness. Patient otherwise has a normal joint exam without other evidence of joint tenderness, synovitis, warmth, erythema, decreased ROM, weakness or deformities. Radiology Reports Reviewed (if available):  Last 3 months  [unfilled]    Lab Reports Reviewed (if available): Last 3 months    No visits with results within 3 Month(s) from this visit.    Latest known visit with results is:   Office Visit on 04/06/2021   Component Date Value Ref Range Status    Glucose 04/06/2021 93  65 - 99 mg/dL Final    BUN 04/06/2021 14  8 - 27 mg/dL Final    Creatinine 04/06/2021 0.96  0.57 - 1.00 mg/dL Final    EGFR IF NonAfrican American 04/06/2021 60  >59 mL/min/1.73 Final    GFR  04/06/2021 69  >59 mL/min/1.73 Final    Bun/Cre Ratio 04/06/2021 15  12 - 28 NA Final    Sodium 04/06/2021 144  134 - 144 mmol/L Final Potassium 04/06/2021 4.0  3.5 - 5.2 mmol/L Final    Chloride 04/06/2021 111 (A)  96 - 106 mmol/L Final    CO2 04/06/2021 19 (A)  20 - 29 mmol/L Final    Calcium 04/06/2021 9.1  8.7 - 10.3 mg/dL Final    Total Protein 04/06/2021 6.4  6.0 - 8.5 g/dL Final    Albumin 04/06/2021 4.3  3.7 - 4.7 g/dL Final    Globulin, Total 04/06/2021 2.1  1.5 - 4.5 g/dL Final    Albumin/Globulin Ratio 04/06/2021 2.0  1.2 - 2.2 NA Final    Total Bilirubin 04/06/2021 0.3  0.0 - 1.2 mg/dL Final    Alkaline Phosphatase 04/06/2021 62  39 - 117 IU/L Final    AST 04/06/2021 20  0 - 40 IU/L Final    ALT 04/06/2021 25  0 - 32 IU/L Final     The results above were reviewed and discussed with patient. Assessment/Plan:   Ron Mckeon is a 67 y.o. female who presents with:     Age-related osteoporosis without current pathological fracture: Her last DEXA scan results from 10/27/2022 was reviewed with the patient today. Since she had an increment in the T score of approximately 4.6% in the hips we will set her to come in for her next Prolia shot on or after April 27 since her last Prolia shot was administered on 10/27/2022. She was instructed to continue calcium 600 mg once a day in the supplement form and 600 mg in her diet while continuing vitamin D 3400 IUs daily along with a multivitamin daily. Her last checked vitamin D level was found to be normal at 66 on 10/7/2022. Long-term use of high-risk medication: If there is any noted abnormality I will keep the patient informed but if not I will review her labs with her on follow-up. -     Comprehensive Metabolic Panel; Future     Disease activity plan:  As stated above. Steroid management plan:  As stated above, if applicable. Pain management plan:  As stated above, if applicable. Weight management plan:  Weight loss through diet and exercise is always encouraged    Disease prognosis: Good    I appreciate the opportunity to continue to participate in the care of this patient. Follow-up and Dispositions    Return in about 6 months (around 9/13/2023). Electronically signed by:  Marquis Livia MD      This note was dictated using dragon voice recognition software.   It has been proofread, but there may still exist voice recognition errors that the author did not detect.                --------------------------------------------------------------------------------------------------------------------------------------------------------------------------------------------------------------------------------

## 2023-03-13 NOTE — Clinical Note
Please schedule the patient for her next Prolia shot on or after 4/27/2023. I did put in the labs for her to have done today which I will review.

## 2023-03-27 ENCOUNTER — TELEPHONE (OUTPATIENT)
Dept: RHEUMATOLOGY | Age: 73
End: 2023-03-27

## 2023-03-27 NOTE — TELEPHONE ENCOUNTER
Continuing Prolia per Dr. Surendra Arevalo last V note. Due 4/27/23 or later. PHONE CALL to patient to schedule. Scheduled for 4/27/23 9:30. Bif received. MCR and [employer] plan D. 100% as ded has been met.      Lab Results   Component Value Date     (H) 03/13/2023    K 4.0 03/13/2023     (H) 03/13/2023    CO2 24 03/13/2023    BUN 13 03/13/2023    CREATININE 0.90 03/13/2023    GLUCOSE 93 03/13/2023    CALCIUM 9.2 03/13/2023    PROT 6.7 03/13/2023    LABALBU 3.6 03/13/2023    BILITOT 0.3 03/13/2023    ALKPHOS 61 03/13/2023    AST 27 03/13/2023    ALT 58 03/13/2023    LABGLOM >60 03/13/2023    GFRAA 69 04/06/2021    AGRATIO 2.0 04/06/2021    GLOB 3.1 03/13/2023     Vitamin D 10/7/22= 66

## 2023-04-04 ENCOUNTER — TELEPHONE (OUTPATIENT)
Dept: RHEUMATOLOGY | Age: 73
End: 2023-04-04

## 2023-04-04 NOTE — TELEPHONE ENCOUNTER
----- Message from Sepideh Montiel MA sent at 3/13/2023  1:39 PM EDT -----  Regarding: MD Sepideh Rey MA    Please schedule the patient for her next Prolia shot on or after 4/27/2023. Chloe Andradeon did put in the labs for her to have done today which I will review.

## 2023-04-04 NOTE — TELEPHONE ENCOUNTER
Prolia due 4/27/23. Last injection was 10/27/22. Scheduled for 4/27/23.    Lab Results   Component Value Date     (H) 03/13/2023    K 4.0 03/13/2023     (H) 03/13/2023    CO2 24 03/13/2023    BUN 13 03/13/2023    CREATININE 0.90 03/13/2023    GLUCOSE 93 03/13/2023    CALCIUM 9.2 03/13/2023    PROT 6.7 03/13/2023    LABALBU 3.6 03/13/2023    BILITOT 0.3 03/13/2023    ALKPHOS 61 03/13/2023    AST 27 03/13/2023    ALT 58 03/13/2023    LABGLOM >60 03/13/2023    GFRAA 69 04/06/2021    AGRATIO 2.0 04/06/2021    GLOB 3.1 03/13/2023

## 2023-04-27 ENCOUNTER — NURSE ONLY (OUTPATIENT)
Dept: RHEUMATOLOGY | Age: 73
End: 2023-04-27

## 2023-04-27 VITALS — SYSTOLIC BLOOD PRESSURE: 112 MMHG | TEMPERATURE: 97.6 F | DIASTOLIC BLOOD PRESSURE: 65 MMHG | HEART RATE: 63 BPM

## 2023-04-27 DIAGNOSIS — M81.0 AGE-RELATED OSTEOPOROSIS WITHOUT CURRENT PATHOLOGICAL FRACTURE: Primary | ICD-10-CM

## 2023-04-27 NOTE — PROGRESS NOTES
[address]   Office : [phone], Fax: [phone]       # 6 Prolia 60mg/ml injected SC today into left  arm. Patient tolerated injection well. Advised patient to continue with calcium and vitamin D post injection. Advised patient to call with any problems post injection. Medication ordered by Dr. Alexandria Deshpande. Pre-infusion/injection questionairre for osteoporosis    1. Have you had or are you planning any dental work?    no  2. Are you taking your calcium and vitamin D?   yes  3. When was your last osteoporosis treatment? 10/27/2022  4. Have you had any recent fractures?    no

## 2023-09-11 ENCOUNTER — OFFICE VISIT (OUTPATIENT)
Dept: RHEUMATOLOGY | Age: 73
End: 2023-09-11
Payer: MEDICARE

## 2023-09-11 VITALS
BODY MASS INDEX: 27.23 KG/M2 | DIASTOLIC BLOOD PRESSURE: 66 MMHG | HEART RATE: 70 BPM | HEIGHT: 62 IN | SYSTOLIC BLOOD PRESSURE: 107 MMHG | WEIGHT: 148 LBS

## 2023-09-11 DIAGNOSIS — Z79.899 LONG-TERM USE OF HIGH-RISK MEDICATION: ICD-10-CM

## 2023-09-11 DIAGNOSIS — M81.0 AGE-RELATED OSTEOPOROSIS WITHOUT CURRENT PATHOLOGICAL FRACTURE: ICD-10-CM

## 2023-09-11 DIAGNOSIS — M81.0 AGE-RELATED OSTEOPOROSIS WITHOUT CURRENT PATHOLOGICAL FRACTURE: Primary | ICD-10-CM

## 2023-09-11 LAB
25(OH)D3 SERPL-MCNC: 42 NG/ML (ref 30–100)
ALBUMIN SERPL-MCNC: 3.7 G/DL (ref 3.2–4.6)
ALBUMIN/GLOB SERPL: 1.2 (ref 0.4–1.6)
ALP SERPL-CCNC: 69 U/L (ref 50–136)
ALT SERPL-CCNC: 33 U/L (ref 12–65)
ANION GAP SERPL CALC-SCNC: 8 MMOL/L (ref 2–11)
AST SERPL-CCNC: 19 U/L (ref 15–37)
BILIRUB SERPL-MCNC: 0.4 MG/DL (ref 0.2–1.1)
BUN SERPL-MCNC: 12 MG/DL (ref 8–23)
CALCIUM SERPL-MCNC: 9.5 MG/DL (ref 8.3–10.4)
CHLORIDE SERPL-SCNC: 118 MMOL/L (ref 101–110)
CO2 SERPL-SCNC: 22 MMOL/L (ref 21–32)
CREAT SERPL-MCNC: 1 MG/DL (ref 0.6–1)
GLOBULIN SER CALC-MCNC: 3.2 G/DL (ref 2.8–4.5)
GLUCOSE SERPL-MCNC: 94 MG/DL (ref 65–100)
POTASSIUM SERPL-SCNC: 4.2 MMOL/L (ref 3.5–5.1)
PROT SERPL-MCNC: 6.9 G/DL (ref 6.3–8.2)
SODIUM SERPL-SCNC: 148 MMOL/L (ref 133–143)

## 2023-09-11 PROCEDURE — 1123F ACP DISCUSS/DSCN MKR DOCD: CPT | Performed by: INTERNAL MEDICINE

## 2023-09-11 PROCEDURE — G8427 DOCREV CUR MEDS BY ELIG CLIN: HCPCS | Performed by: INTERNAL MEDICINE

## 2023-09-11 PROCEDURE — 1036F TOBACCO NON-USER: CPT | Performed by: INTERNAL MEDICINE

## 2023-09-11 PROCEDURE — 3017F COLORECTAL CA SCREEN DOC REV: CPT | Performed by: INTERNAL MEDICINE

## 2023-09-11 PROCEDURE — G8399 PT W/DXA RESULTS DOCUMENT: HCPCS | Performed by: INTERNAL MEDICINE

## 2023-09-11 PROCEDURE — 99213 OFFICE O/P EST LOW 20 MIN: CPT | Performed by: INTERNAL MEDICINE

## 2023-09-11 PROCEDURE — G8417 CALC BMI ABV UP PARAM F/U: HCPCS | Performed by: INTERNAL MEDICINE

## 2023-09-11 PROCEDURE — 1090F PRES/ABSN URINE INCON ASSESS: CPT | Performed by: INTERNAL MEDICINE

## 2023-09-11 ASSESSMENT — ROUTINE ASSESSMENT OF PATIENT INDEX DATA (RAPID3)
ON A SCALE OF ONE TO TEN, HOW MUCH OF A PROBLEM HAS UNUSUAL FATIGUE OR TIREDNESS BEEN FOR YOU OVER THE PAST WEEK?: 0
ON A SCALE OF ONE TO TEN, HOW DIFFICULT WAS IT FOR YOU TO COMPLETE THE LISTED DAILY PHYSICAL TASKS OVER THE LAST WEEK: 0.2
ON A SCALE OF ONE TO TEN, CONSIDERING ALL THE WAYS IN WHICH ILLNESS AND HEALTH CONDITIONS MAY AFFECT YOU AT THIS TIME, PLEASE INDICATE BELOW HOW YOU ARE DOING:: 1
ON A SCALE OF ONE TO TEN, HOW MUCH PAIN HAVE YOU HAD BECAUSE OF YOUR CONDITION OVER THE PAST WEEK?: 0

## 2023-09-11 NOTE — PROGRESS NOTES
Ofelia oGuld M.D.  [address] [place] [address], [address] [place] [address], [address] [place] [address]  Office : [phone], Fax: [phone] OFFICE VISIT NOTE  Date of Visit:  2023 8:34 AM    Patient Information:  Name:  Jasmyn Jose  :  1950  Age:  75 y.o. Gender:  female      Ms. Neil Mora is here today for follow-up of osteoporosis and medication monitoring. Last visit: 3/13/2023    History of Present Illness: On talking to the patient today she states that she has had migraines and is on Imitrex as needed and takes Topamax twice daily for her headaches. With regard to her seasonal allergies she takes Zyrtec year round which helps to some extent. Currently she does complain of having a dry cough and does have a h/o GERD and takes 2 prescription medications of each once a day. Currently she denies any PND with no shortness of breath. Her current joint complaints are minimal as mentioned below. Since the last visit, patient is feeling \"good\". Pain: 1/10  Location:  Bilateral shoulder pain with no para spinal muscle pain. Occasional lower back pain. Quality: Deep achy pain. Modifying Factors:  Keeping her shoulders up worsens the shoulder pain. Associated Symptoms:  No tingling, numbness or pain down the arms or legs.         2023     7:00 AM   DMARD/Biologic   AM Stiffness None   Pain 0   Fatigue 0   MDHAQ 0.2   Patient Global Score 1   Medication Name Other   Other Medication prolia     Last TB screen: NA  TB result: NA     Current dose of steroids: none  How long on current dose of steroids: NA  How long on continuous steroid therapy: NA     Past DMARDs, if applicable (methotrexate, plaquenil/hydroxychloroquine, sulfasalazine, Arava/leflunomide):none     Past biologics, if applicable (enbrel, humira, simponi, cimzia, xeljanz, Ianton, remicade, simponi aria, actemra, rituximab, Josephus Fonder, stelara, cosentyx):none     Past NSAIDs, if

## 2023-10-03 ENCOUNTER — TELEPHONE (OUTPATIENT)
Dept: RHEUMATOLOGY | Age: 73
End: 2023-10-03

## 2023-10-03 NOTE — TELEPHONE ENCOUNTER
Prolia due 10/27/23. Last injection given 4/27/23. Will be covered 100% since [facility] ded has been met. PHONE CALL to patient to schedule. LEFT MESSAGE on her VM to call me back.    Lab Results   Component Value Date    CALCIUM 9.5 09/11/2023     Lab Results   Component Value Date/Time    VITD25 42.0 09/11/2023 08:51 AM      Lab Results   Component Value Date    CREATININE 1.00 09/11/2023

## 2023-10-03 NOTE — TELEPHONE ENCOUNTER
----- Message from Melanie Carlin sent at 10/2/2023 12:26 PM EDT -----  Regarding: Prolia  bif received 10/1/2023 Prolia - MCR and BCBS - Prolia and administration will be subject to a $226.00 deductible ($226.00 met) and  20.0%. Secondary is a Medicare Supplement Plan D and it covers the Medicare Part B co-insurance. This plan does not cover the Medicare Part B  deductible of $226.00 which $226.00 is met.  10/4/2023 eh due 10/27/31 last one 4/27/23

## 2023-10-31 ENCOUNTER — NURSE ONLY (OUTPATIENT)
Dept: RHEUMATOLOGY | Age: 73
End: 2023-10-31
Payer: MEDICARE

## 2023-10-31 VITALS — HEART RATE: 70 BPM | DIASTOLIC BLOOD PRESSURE: 74 MMHG | TEMPERATURE: 98.2 F | SYSTOLIC BLOOD PRESSURE: 146 MMHG

## 2023-10-31 DIAGNOSIS — M81.0 AGE-RELATED OSTEOPOROSIS WITHOUT CURRENT PATHOLOGICAL FRACTURE: Primary | ICD-10-CM

## 2023-10-31 PROCEDURE — 96372 THER/PROPH/DIAG INJ SC/IM: CPT | Performed by: INTERNAL MEDICINE

## 2023-10-31 NOTE — PROGRESS NOTES
[place], [address] [facility], [address] [place] [address]  Office : [phone], Fax: [phone]       # 7  Prolia 60mg/ml injected SC today into left  arm. Patient tolerated injection well. Advised patient to continue with calcium and vitamin D post injection. Advised patient to call with any problems post injection. Medication ordered by Dr. Venkatesh Seals. ( Covering for Dr Kacie Melgoza)    Pre-infusion/injection questionairre for osteoporosis    1. Have you had or are you planning any dental work?  no    2. Are you taking your calcium and vitamin D? yes    3. When was your last osteoporosis treatment? 4/27/2023    4. Have you had any recent fractures?   no

## 2024-02-09 RX ORDER — TOPIRAMATE 100 MG/1
TABLET, FILM COATED ORAL
Qty: 270 TABLET | Refills: 3 | Status: SHIPPED | OUTPATIENT
Start: 2024-02-09

## 2024-03-08 ENCOUNTER — OFFICE VISIT (OUTPATIENT)
Dept: NEUROLOGY | Age: 74
End: 2024-03-08
Payer: MEDICARE

## 2024-03-08 DIAGNOSIS — G43.709 CHRONIC MIGRAINE WITHOUT AURA WITHOUT STATUS MIGRAINOSUS, NOT INTRACTABLE: Primary | ICD-10-CM

## 2024-03-08 PROCEDURE — G8399 PT W/DXA RESULTS DOCUMENT: HCPCS | Performed by: PSYCHIATRY & NEUROLOGY

## 2024-03-08 PROCEDURE — 3017F COLORECTAL CA SCREEN DOC REV: CPT | Performed by: PSYCHIATRY & NEUROLOGY

## 2024-03-08 PROCEDURE — G8417 CALC BMI ABV UP PARAM F/U: HCPCS | Performed by: PSYCHIATRY & NEUROLOGY

## 2024-03-08 PROCEDURE — 1123F ACP DISCUSS/DSCN MKR DOCD: CPT | Performed by: PSYCHIATRY & NEUROLOGY

## 2024-03-08 PROCEDURE — G8427 DOCREV CUR MEDS BY ELIG CLIN: HCPCS | Performed by: PSYCHIATRY & NEUROLOGY

## 2024-03-08 PROCEDURE — 1036F TOBACCO NON-USER: CPT | Performed by: PSYCHIATRY & NEUROLOGY

## 2024-03-08 PROCEDURE — 99214 OFFICE O/P EST MOD 30 MIN: CPT | Performed by: PSYCHIATRY & NEUROLOGY

## 2024-03-08 PROCEDURE — G8484 FLU IMMUNIZE NO ADMIN: HCPCS | Performed by: PSYCHIATRY & NEUROLOGY

## 2024-03-08 PROCEDURE — 1090F PRES/ABSN URINE INCON ASSESS: CPT | Performed by: PSYCHIATRY & NEUROLOGY

## 2024-03-08 RX ORDER — TOPIRAMATE 100 MG/1
TABLET, FILM COATED ORAL
Qty: 270 TABLET | Refills: 3 | Status: SHIPPED | OUTPATIENT
Start: 2024-03-08

## 2024-03-08 RX ORDER — SUMATRIPTAN 100 MG/1
TABLET, FILM COATED ORAL
Qty: 27 TABLET | Refills: 3 | Status: SHIPPED | OUTPATIENT
Start: 2024-03-08

## 2024-03-08 ASSESSMENT — ENCOUNTER SYMPTOMS
RESPIRATORY NEGATIVE: 1
GASTROINTESTINAL NEGATIVE: 1
ALLERGIC/IMMUNOLOGIC NEGATIVE: 1
EYES NEGATIVE: 1

## 2024-03-08 NOTE — PROGRESS NOTES
[facility] NEUROLOGY  [address] [place] [address], [address]  [place] [address]  Phone: [phone] Fax [phone]  Dr. David Carmona      3/8/2024  June Castano     Patient is referred by the following provider for consultation regarding as below:       I reviewed the available records and notes and have examined patient with the following findings:     Chief Complaint:  No chief complaint on file.         HPI: This is a right handed 73 y.o.  female who is very pleasant patient who when her  passed away there was a significant decrease in stress and her headaches definitely improved.  She really almost never has a severe headache.  She was getting severe headaches daily for years but now with Imitrex 100 mg and Topamax 100 mg 1 and half twice a day.  She still gets headaches that are nowhere near as severe as they used to be they are no longer pounding throbbing pressure photophobia phonophobia nausea and they are frontal headaches.  So she was having those but no longer the anywhere as severe.  She will get a week or 2 of headaches and then she can go 2 or 3 weeks without headaches.  When she gets a headache she was using 1/4 tablet of Imitrex now she uses 1/2 tablet.  Sometimes it breaks her headache sometimes not and there is definitely nausea associated with her headaches.  Her headaches went from being daily down to sporadically.  So definite improvement.  And she does not want to change any medicines    She does have a mild head tremor.    IMAGING REVIEW:  I REVIEWED PERTINENT  IMAGES AND REPORTS WITH THE PATIENT PERSONALLY, DIRECTLY AND FULLY.     Past Medical History:  Past Medical History:   Diagnosis Date    Diverticulitis     GERD (gastroesophageal reflux disease)     GERD (gastroesophageal reflux disease) 8/1/2013    Osteoporosis 8/1/2013    Thyroid disease     hypothroidism       Past Surgical History:  Past Surgical History:   Procedure Laterality Date    HEENT      sinus surgery x 2

## 2024-03-11 ENCOUNTER — OFFICE VISIT (OUTPATIENT)
Dept: RHEUMATOLOGY | Age: 74
End: 2024-03-11
Payer: MEDICARE

## 2024-03-11 VITALS
BODY MASS INDEX: 27.79 KG/M2 | WEIGHT: 151 LBS | HEIGHT: 62 IN | HEART RATE: 73 BPM | DIASTOLIC BLOOD PRESSURE: 72 MMHG | SYSTOLIC BLOOD PRESSURE: 138 MMHG

## 2024-03-11 DIAGNOSIS — Z79.899 LONG-TERM USE OF HIGH-RISK MEDICATION: ICD-10-CM

## 2024-03-11 DIAGNOSIS — M81.0 AGE-RELATED OSTEOPOROSIS WITHOUT CURRENT PATHOLOGICAL FRACTURE: Primary | ICD-10-CM

## 2024-03-11 LAB
ALBUMIN SERPL-MCNC: 3.6 G/DL (ref 3.2–4.6)
ALBUMIN/GLOB SERPL: 1 (ref 0.4–1.6)
ALP SERPL-CCNC: 83 U/L (ref 50–136)
ALT SERPL-CCNC: 44 U/L (ref 12–65)
ANION GAP SERPL CALC-SCNC: 5 MMOL/L (ref 2–11)
AST SERPL-CCNC: 26 U/L (ref 15–37)
BILIRUB SERPL-MCNC: 0.4 MG/DL (ref 0.2–1.1)
BUN SERPL-MCNC: 9 MG/DL (ref 8–23)
CALCIUM SERPL-MCNC: 9.1 MG/DL (ref 8.3–10.4)
CHLORIDE SERPL-SCNC: 115 MMOL/L (ref 103–113)
CO2 SERPL-SCNC: 23 MMOL/L (ref 21–32)
CREAT SERPL-MCNC: 1 MG/DL (ref 0.6–1)
GLOBULIN SER CALC-MCNC: 3.5 G/DL (ref 2.8–4.5)
GLUCOSE SERPL-MCNC: 88 MG/DL (ref 65–100)
POTASSIUM SERPL-SCNC: 4.2 MMOL/L (ref 3.5–5.1)
PROT SERPL-MCNC: 7.1 G/DL (ref 6.3–8.2)
SODIUM SERPL-SCNC: 143 MMOL/L (ref 136–146)

## 2024-03-11 PROCEDURE — G8427 DOCREV CUR MEDS BY ELIG CLIN: HCPCS | Performed by: INTERNAL MEDICINE

## 2024-03-11 PROCEDURE — 1090F PRES/ABSN URINE INCON ASSESS: CPT | Performed by: INTERNAL MEDICINE

## 2024-03-11 PROCEDURE — 99214 OFFICE O/P EST MOD 30 MIN: CPT | Performed by: INTERNAL MEDICINE

## 2024-03-11 PROCEDURE — G8417 CALC BMI ABV UP PARAM F/U: HCPCS | Performed by: INTERNAL MEDICINE

## 2024-03-11 PROCEDURE — 3017F COLORECTAL CA SCREEN DOC REV: CPT | Performed by: INTERNAL MEDICINE

## 2024-03-11 PROCEDURE — 1036F TOBACCO NON-USER: CPT | Performed by: INTERNAL MEDICINE

## 2024-03-11 PROCEDURE — G8399 PT W/DXA RESULTS DOCUMENT: HCPCS | Performed by: INTERNAL MEDICINE

## 2024-03-11 PROCEDURE — 1123F ACP DISCUSS/DSCN MKR DOCD: CPT | Performed by: INTERNAL MEDICINE

## 2024-03-11 PROCEDURE — G8484 FLU IMMUNIZE NO ADMIN: HCPCS | Performed by: INTERNAL MEDICINE

## 2024-03-11 RX ORDER — ACETAMINOPHEN 500 MG
500 TABLET ORAL EVERY 6 HOURS PRN
COMMUNITY

## 2024-03-11 ASSESSMENT — ROUTINE ASSESSMENT OF PATIENT INDEX DATA (RAPID3)
ON A SCALE OF ONE TO TEN, HOW MUCH OF A PROBLEM HAS UNUSUAL FATIGUE OR TIREDNESS BEEN FOR YOU OVER THE PAST WEEK?: 0
ON A SCALE OF ONE TO TEN, CONSIDERING ALL THE WAYS IN WHICH ILLNESS AND HEALTH CONDITIONS MAY AFFECT YOU AT THIS TIME, PLEASE INDICATE BELOW HOW YOU ARE DOING:: 1
ON A SCALE OF ONE TO TEN, HOW DIFFICULT WAS IT FOR YOU TO COMPLETE THE LISTED DAILY PHYSICAL TASKS OVER THE LAST WEEK: 0.0
ON A SCALE OF ONE TO TEN, HOW MUCH PAIN HAVE YOU HAD BECAUSE OF YOUR CONDITION OVER THE PAST WEEK?: 0

## 2024-03-18 ENCOUNTER — TELEPHONE (OUTPATIENT)
Dept: RHEUMATOLOGY | Age: 74
End: 2024-03-18

## 2024-03-18 NOTE — TELEPHONE ENCOUNTER
----- Message from Ann Aguirre MA sent at 3/11/2024  9:59 AM EDT -----  Regarding: Brien Soares MD Lipscomb, Ann WOLF MA  Once today's labs are back please schedule patient for her next Prolia shot on or after 5/01/2024.

## 2024-03-18 NOTE — TELEPHONE ENCOUNTER
Prolia due 5/1/24. Last injection was 10/31/23. MCR and BCBS. Should be covered 100%.     Lab Results   Component Value Date     03/11/2024    K 4.2 03/11/2024     (H) 03/11/2024    CO2 23 03/11/2024    BUN 9 03/11/2024    CREATININE 1.00 03/11/2024    GLUCOSE 88 03/11/2024    CALCIUM 9.1 03/11/2024    PROT 7.1 03/11/2024    LABALBU 3.6 03/11/2024    BILITOT 0.4 03/11/2024    ALKPHOS 83 03/11/2024    AST 26 03/11/2024    ALT 44 03/11/2024    LABGLOM 59 (L) 03/11/2024    GFRAA 69 04/06/2021    AGRATIO 1.0 03/11/2024    GLOB 3.5 03/11/2024       Lab Results   Component Value Date/Time    VITD25 42.0 09/11/2023 08:51 AM

## 2024-04-23 RX ORDER — ONDANSETRON 2 MG/ML
8 INJECTION INTRAMUSCULAR; INTRAVENOUS
OUTPATIENT
Start: 2024-05-01

## 2024-04-23 RX ORDER — EPINEPHRINE 1 MG/ML
0.3 INJECTION, SOLUTION, CONCENTRATE INTRAVENOUS PRN
OUTPATIENT
Start: 2024-05-01

## 2024-04-23 RX ORDER — DIPHENHYDRAMINE HYDROCHLORIDE 50 MG/ML
50 INJECTION INTRAMUSCULAR; INTRAVENOUS
OUTPATIENT
Start: 2024-05-01

## 2024-04-23 RX ORDER — ALBUTEROL SULFATE 90 UG/1
4 AEROSOL, METERED RESPIRATORY (INHALATION) PRN
OUTPATIENT
Start: 2024-05-01

## 2024-04-23 RX ORDER — ACETAMINOPHEN 325 MG/1
650 TABLET ORAL
OUTPATIENT
Start: 2024-05-01

## 2024-04-23 RX ORDER — FAMOTIDINE 10 MG/ML
20 INJECTION, SOLUTION INTRAVENOUS
OUTPATIENT
Start: 2024-05-01

## 2024-04-23 RX ORDER — SODIUM CHLORIDE 9 MG/ML
INJECTION, SOLUTION INTRAVENOUS CONTINUOUS
OUTPATIENT
Start: 2024-05-01

## 2024-05-01 ENCOUNTER — NURSE ONLY (OUTPATIENT)
Dept: RHEUMATOLOGY | Age: 74
End: 2024-05-01

## 2024-05-01 VITALS — SYSTOLIC BLOOD PRESSURE: 132 MMHG | HEART RATE: 73 BPM | DIASTOLIC BLOOD PRESSURE: 72 MMHG | TEMPERATURE: 97.2 F

## 2024-05-01 DIAGNOSIS — M81.0 AGE-RELATED OSTEOPOROSIS WITHOUT CURRENT PATHOLOGICAL FRACTURE: Primary | ICD-10-CM

## 2024-05-01 RX ORDER — SODIUM CHLORIDE 9 MG/ML
INJECTION, SOLUTION INTRAVENOUS CONTINUOUS
Status: DISCONTINUED | OUTPATIENT
Start: 2024-05-01 | End: 2024-05-01 | Stop reason: HOSPADM

## 2024-05-01 RX ORDER — SODIUM CHLORIDE 9 MG/ML
INJECTION, SOLUTION INTRAVENOUS CONTINUOUS
OUTPATIENT
Start: 2024-10-30

## 2024-05-01 RX ORDER — DIPHENHYDRAMINE HYDROCHLORIDE 50 MG/ML
50 INJECTION INTRAMUSCULAR; INTRAVENOUS
Status: DISCONTINUED | OUTPATIENT
Start: 2024-05-01 | End: 2024-05-01 | Stop reason: HOSPADM

## 2024-05-01 RX ORDER — DIPHENHYDRAMINE HYDROCHLORIDE 50 MG/ML
50 INJECTION INTRAMUSCULAR; INTRAVENOUS
OUTPATIENT
Start: 2024-10-30

## 2024-05-01 RX ORDER — ACETAMINOPHEN 325 MG/1
650 TABLET ORAL
Status: DISCONTINUED | OUTPATIENT
Start: 2024-05-01 | End: 2024-05-01 | Stop reason: HOSPADM

## 2024-05-01 RX ORDER — ONDANSETRON 2 MG/ML
8 INJECTION INTRAMUSCULAR; INTRAVENOUS
Status: DISCONTINUED | OUTPATIENT
Start: 2024-05-01 | End: 2024-05-01 | Stop reason: HOSPADM

## 2024-05-01 RX ORDER — EPINEPHRINE 1 MG/ML
0.3 INJECTION, SOLUTION, CONCENTRATE INTRAVENOUS PRN
OUTPATIENT
Start: 2024-10-30

## 2024-05-01 RX ORDER — EPINEPHRINE 1 MG/ML
0.3 INJECTION, SOLUTION, CONCENTRATE INTRAVENOUS PRN
Status: DISCONTINUED | OUTPATIENT
Start: 2024-05-01 | End: 2024-05-01 | Stop reason: HOSPADM

## 2024-05-01 RX ORDER — ACETAMINOPHEN 325 MG/1
650 TABLET ORAL
OUTPATIENT
Start: 2024-10-30

## 2024-05-01 RX ORDER — ALBUTEROL SULFATE 90 UG/1
4 AEROSOL, METERED RESPIRATORY (INHALATION) PRN
OUTPATIENT
Start: 2024-10-30

## 2024-05-01 RX ORDER — ONDANSETRON 2 MG/ML
8 INJECTION INTRAMUSCULAR; INTRAVENOUS
OUTPATIENT
Start: 2024-10-30

## 2024-05-01 NOTE — PROGRESS NOTES
[facility] RHEUMATOLOGY  [address] [place] [address], [address]  [place] [address]  Office : [phone], Fax: [phone]       # 8  Prolia 60mg/ml injected SC today into left  arm. Patient tolerated injection well. Advised patient to continue with calcium and vitamin D post injection. Advised patient to call with any problems post injection.   Medication ordered by Dr. Rhodes.     Pre-infusion/injection questionairre for osteoporosis    1. Have you had or are you planning any dental work?      2. Are you taking your calcium and vitamin D?     3. When was your last osteoporosis treatment?  10/31/2023     4. Have you had any recent fractures?no    5. Are you currently in skilled nursing or in patient rehab? no

## 2024-09-09 ENCOUNTER — OFFICE VISIT (OUTPATIENT)
Dept: RHEUMATOLOGY | Age: 74
End: 2024-09-09
Payer: MEDICARE

## 2024-09-09 VITALS
HEIGHT: 62 IN | DIASTOLIC BLOOD PRESSURE: 82 MMHG | SYSTOLIC BLOOD PRESSURE: 135 MMHG | HEART RATE: 101 BPM | WEIGHT: 155.2 LBS | BODY MASS INDEX: 28.56 KG/M2

## 2024-09-09 DIAGNOSIS — Z79.899 LONG-TERM USE OF HIGH-RISK MEDICATION: ICD-10-CM

## 2024-09-09 DIAGNOSIS — M85.88 OSTEOPENIA OF LUMBAR SPINE: Primary | ICD-10-CM

## 2024-09-09 DIAGNOSIS — M85.88 OSTEOPENIA OF LUMBAR SPINE: ICD-10-CM

## 2024-09-09 LAB
25(OH)D3 SERPL-MCNC: 58 NG/ML (ref 30–100)
ALBUMIN SERPL-MCNC: 3.6 G/DL (ref 3.2–4.6)
ALBUMIN/GLOB SERPL: 1.2 (ref 1–1.9)
ALP SERPL-CCNC: 77 U/L (ref 35–104)
ALT SERPL-CCNC: 28 U/L (ref 12–65)
ANION GAP SERPL CALC-SCNC: 10 MMOL/L (ref 9–18)
AST SERPL-CCNC: 24 U/L (ref 15–37)
BILIRUB SERPL-MCNC: 0.3 MG/DL (ref 0–1.2)
BUN SERPL-MCNC: 11 MG/DL (ref 8–23)
CALCIUM SERPL-MCNC: 9.3 MG/DL (ref 8.8–10.2)
CHLORIDE SERPL-SCNC: 110 MMOL/L (ref 98–107)
CO2 SERPL-SCNC: 22 MMOL/L (ref 20–28)
CREAT SERPL-MCNC: 0.97 MG/DL (ref 0.6–1.1)
GLOBULIN SER CALC-MCNC: 3 G/DL (ref 2.3–3.5)
GLUCOSE SERPL-MCNC: 87 MG/DL (ref 70–99)
POTASSIUM SERPL-SCNC: 4 MMOL/L (ref 3.5–5.1)
PROT SERPL-MCNC: 6.6 G/DL (ref 6.3–8.2)
SODIUM SERPL-SCNC: 141 MMOL/L (ref 136–145)

## 2024-09-09 PROCEDURE — G2211 COMPLEX E/M VISIT ADD ON: HCPCS | Performed by: INTERNAL MEDICINE

## 2024-09-09 PROCEDURE — 1090F PRES/ABSN URINE INCON ASSESS: CPT | Performed by: INTERNAL MEDICINE

## 2024-09-09 PROCEDURE — G8427 DOCREV CUR MEDS BY ELIG CLIN: HCPCS | Performed by: INTERNAL MEDICINE

## 2024-09-09 PROCEDURE — G8417 CALC BMI ABV UP PARAM F/U: HCPCS | Performed by: INTERNAL MEDICINE

## 2024-09-09 PROCEDURE — 1036F TOBACCO NON-USER: CPT | Performed by: INTERNAL MEDICINE

## 2024-09-09 PROCEDURE — 1123F ACP DISCUSS/DSCN MKR DOCD: CPT | Performed by: INTERNAL MEDICINE

## 2024-09-09 PROCEDURE — G8399 PT W/DXA RESULTS DOCUMENT: HCPCS | Performed by: INTERNAL MEDICINE

## 2024-09-09 PROCEDURE — 3017F COLORECTAL CA SCREEN DOC REV: CPT | Performed by: INTERNAL MEDICINE

## 2024-09-09 PROCEDURE — 99214 OFFICE O/P EST MOD 30 MIN: CPT | Performed by: INTERNAL MEDICINE

## 2024-09-09 RX ORDER — FAMOTIDINE 40 MG/1
40 TABLET, FILM COATED ORAL DAILY
COMMUNITY
Start: 2024-07-16

## 2024-09-09 ASSESSMENT — JOINT PAIN
TOTAL NUMBER OF SWOLLEN JOINTS: 0
TOTAL NUMBER OF TENDER JOINTS: 0

## 2024-09-09 ASSESSMENT — ROUTINE ASSESSMENT OF PATIENT INDEX DATA (RAPID3)
ON A SCALE OF ONE TO TEN, CONSIDERING ALL THE WAYS IN WHICH ILLNESS AND HEALTH CONDITIONS MAY AFFECT YOU AT THIS TIME, PLEASE INDICATE BELOW HOW YOU ARE DOING:: 0
ON A SCALE OF ONE TO TEN, HOW MUCH PAIN HAVE YOU HAD BECAUSE OF YOUR CONDITION OVER THE PAST WEEK?: 0
ON A SCALE OF ONE TO TEN, HOW DIFFICULT WAS IT FOR YOU TO COMPLETE THE LISTED DAILY PHYSICAL TASKS OVER THE LAST WEEK: 0.1
ON A SCALE OF ONE TO TEN, HOW MUCH OF A PROBLEM HAS UNUSUAL FATIGUE OR TIREDNESS BEEN FOR YOU OVER THE PAST WEEK?: 0

## 2024-10-28 ENCOUNTER — HOSPITAL ENCOUNTER (OUTPATIENT)
Dept: MAMMOGRAPHY | Age: 74
Discharge: HOME | End: 2024-10-31
Attending: INTERNAL MEDICINE
Payer: MEDICARE

## 2024-10-28 DIAGNOSIS — M85.88 OSTEOPENIA OF LUMBAR SPINE: ICD-10-CM

## 2024-10-28 PROCEDURE — 77080 DXA BONE DENSITY AXIAL: CPT

## 2024-10-31 ENCOUNTER — TELEPHONE (OUTPATIENT)
Dept: RHEUMATOLOGY | Age: 74
End: 2024-10-31

## 2024-10-31 NOTE — TELEPHONE ENCOUNTER
Pt left vm that she had her BMD done on 10/28/24. Her next ov is 04/07/25 and her last Prolia injection was done on 05/01/24. Dr Rhodes said in her note that the pt will be due for her next Prolia injection on or after 11/01/24. Fiordaliza is checking with Aislinn if injections can be given tomorrow and if not we will schedule her for next week.

## 2024-11-04 ENCOUNTER — NURSE ONLY (OUTPATIENT)
Dept: RHEUMATOLOGY | Age: 74
End: 2024-11-04
Payer: MEDICARE

## 2024-11-04 VITALS — SYSTOLIC BLOOD PRESSURE: 138 MMHG | TEMPERATURE: 97.5 F | HEART RATE: 86 BPM | DIASTOLIC BLOOD PRESSURE: 80 MMHG

## 2024-11-04 DIAGNOSIS — M81.0 AGE-RELATED OSTEOPOROSIS WITHOUT CURRENT PATHOLOGICAL FRACTURE: Primary | ICD-10-CM

## 2024-11-04 PROCEDURE — 96372 THER/PROPH/DIAG INJ SC/IM: CPT | Performed by: INTERNAL MEDICINE

## 2024-11-04 RX ORDER — ONDANSETRON 2 MG/ML
8 INJECTION INTRAMUSCULAR; INTRAVENOUS
Status: DISCONTINUED | OUTPATIENT
Start: 2024-11-04 | End: 2024-11-04 | Stop reason: HOSPADM

## 2024-11-04 RX ORDER — SODIUM CHLORIDE 9 MG/ML
INJECTION, SOLUTION INTRAVENOUS CONTINUOUS
Status: DISCONTINUED | OUTPATIENT
Start: 2024-11-04 | End: 2024-11-04 | Stop reason: HOSPADM

## 2024-11-04 RX ORDER — DIPHENHYDRAMINE HYDROCHLORIDE 50 MG/ML
50 INJECTION INTRAMUSCULAR; INTRAVENOUS
Status: CANCELLED | OUTPATIENT
Start: 2025-04-28

## 2024-11-04 RX ORDER — EPINEPHRINE 1 MG/ML
0.3 INJECTION, SOLUTION, CONCENTRATE INTRAVENOUS PRN
Status: DISCONTINUED | OUTPATIENT
Start: 2024-11-04 | End: 2024-11-04 | Stop reason: HOSPADM

## 2024-11-04 RX ORDER — EPINEPHRINE 1 MG/ML
0.3 INJECTION, SOLUTION, CONCENTRATE INTRAVENOUS PRN
Status: CANCELLED | OUTPATIENT
Start: 2025-04-28

## 2024-11-04 RX ORDER — SODIUM CHLORIDE 9 MG/ML
INJECTION, SOLUTION INTRAVENOUS CONTINUOUS
Status: CANCELLED | OUTPATIENT
Start: 2025-04-28

## 2024-11-04 RX ORDER — ACETAMINOPHEN 325 MG/1
650 TABLET ORAL
Status: CANCELLED | OUTPATIENT
Start: 2025-04-28

## 2024-11-04 RX ORDER — ALBUTEROL SULFATE 90 UG/1
4 INHALANT RESPIRATORY (INHALATION) PRN
OUTPATIENT
Start: 2025-04-28

## 2024-11-04 RX ORDER — DIPHENHYDRAMINE HYDROCHLORIDE 50 MG/ML
50 INJECTION INTRAMUSCULAR; INTRAVENOUS
Status: DISCONTINUED | OUTPATIENT
Start: 2024-11-04 | End: 2024-11-04 | Stop reason: HOSPADM

## 2024-11-04 RX ORDER — ONDANSETRON 2 MG/ML
8 INJECTION INTRAMUSCULAR; INTRAVENOUS
Status: CANCELLED | OUTPATIENT
Start: 2025-04-28

## 2024-11-04 RX ORDER — ACETAMINOPHEN 325 MG/1
650 TABLET ORAL
Status: DISCONTINUED | OUTPATIENT
Start: 2024-11-04 | End: 2024-11-04 | Stop reason: HOSPADM

## 2024-11-04 NOTE — PROGRESS NOTES
[facility] RHEUMATOLOGY  [address] [place] [address], [address]  [place] [address]  Office : [phone], Fax: [phone]       #9 Prolia 60mg/ml injected SC today into left arm. Patient tolerated injection well. Advised patient to continue with calcium and vitamin D post injection. Advised patient to call with any problems post injection.   Medication ordered by Dr. Rhodes    Pre-infusion/injection questionairre for osteoporosis    1. Have you had or are you planning any dental work? No    2. Are you taking your calcium and vitamin D? Yes    3. When was your last osteoporosis treatment? 5/1/2024    4. Have you had any recent fractures? No    5. Are you currently in skilled nursing or in patient rehab? No

## 2025-01-03 RX ORDER — TOPIRAMATE 100 MG/1
TABLET, FILM COATED ORAL
Qty: 270 TABLET | Refills: 3 | OUTPATIENT
Start: 2025-01-03

## 2025-04-04 ENCOUNTER — OFFICE VISIT (OUTPATIENT)
Dept: NEUROLOGY | Age: 75
End: 2025-04-04
Payer: MEDICARE

## 2025-04-04 DIAGNOSIS — G43.709 CHRONIC MIGRAINE WITHOUT AURA WITHOUT STATUS MIGRAINOSUS, NOT INTRACTABLE: Primary | ICD-10-CM

## 2025-04-04 DIAGNOSIS — G25.0 ESSENTIAL TREMOR: ICD-10-CM

## 2025-04-04 PROCEDURE — 1036F TOBACCO NON-USER: CPT | Performed by: PSYCHIATRY & NEUROLOGY

## 2025-04-04 PROCEDURE — 99214 OFFICE O/P EST MOD 30 MIN: CPT | Performed by: PSYCHIATRY & NEUROLOGY

## 2025-04-04 PROCEDURE — 1090F PRES/ABSN URINE INCON ASSESS: CPT | Performed by: PSYCHIATRY & NEUROLOGY

## 2025-04-04 PROCEDURE — G8427 DOCREV CUR MEDS BY ELIG CLIN: HCPCS | Performed by: PSYCHIATRY & NEUROLOGY

## 2025-04-04 PROCEDURE — 1159F MED LIST DOCD IN RCRD: CPT | Performed by: PSYCHIATRY & NEUROLOGY

## 2025-04-04 PROCEDURE — G8399 PT W/DXA RESULTS DOCUMENT: HCPCS | Performed by: PSYCHIATRY & NEUROLOGY

## 2025-04-04 PROCEDURE — 1160F RVW MEDS BY RX/DR IN RCRD: CPT | Performed by: PSYCHIATRY & NEUROLOGY

## 2025-04-04 PROCEDURE — G8417 CALC BMI ABV UP PARAM F/U: HCPCS | Performed by: PSYCHIATRY & NEUROLOGY

## 2025-04-04 PROCEDURE — 3017F COLORECTAL CA SCREEN DOC REV: CPT | Performed by: PSYCHIATRY & NEUROLOGY

## 2025-04-04 PROCEDURE — 1123F ACP DISCUSS/DSCN MKR DOCD: CPT | Performed by: PSYCHIATRY & NEUROLOGY

## 2025-04-04 RX ORDER — TOPIRAMATE 100 MG/1
TABLET, FILM COATED ORAL
Qty: 270 TABLET | Refills: 3 | Status: SHIPPED | OUTPATIENT
Start: 2025-04-04

## 2025-04-04 RX ORDER — SUMATRIPTAN SUCCINATE 100 MG/1
TABLET ORAL
Qty: 27 TABLET | Refills: 3 | Status: SHIPPED | OUTPATIENT
Start: 2025-04-04

## 2025-04-04 ASSESSMENT — ENCOUNTER SYMPTOMS
ALLERGIC/IMMUNOLOGIC NEGATIVE: 1
GASTROINTESTINAL NEGATIVE: 1
RESPIRATORY NEGATIVE: 1
EYES NEGATIVE: 1

## 2025-04-04 NOTE — PROGRESS NOTES
Allergies   Allergen Reactions    Ciprofloxacin Diarrhea and Rash     Other reaction(s): Rash-Allergy  Other reaction(s): rash, diarrhea    Amoxicillin-Pot Clavulanate Diarrhea     Other reaction(s): Diarrhea-Intolerance       Review of Systems:  Review of Systems   Eyes: Negative.    Respiratory: Negative.     Cardiovascular: Negative.    Gastrointestinal: Negative.    Endocrine: Negative.    Genitourinary: Negative.    Allergic/Immunologic: Negative.    Neurological:  Positive for headaches.   Psychiatric/Behavioral: Negative.        Failed to redirect to the Timeline version of the [employer] SmartLink.  Failed to redirect to the Timeline version of the [employer] SmartLink.       There were no vitals filed for this visit.     Physical Exam  Constitutional:       Appearance: Normal appearance.   HENT:      Head: Normocephalic and atraumatic.   Eyes:      Extraocular Movements: Extraocular movements intact.   Cardiovascular:      Rate and Rhythm: Normal rate.      Pulses: Normal pulses.   Pulmonary:      Effort: Pulmonary effort is normal.   Abdominal:      General: Abdomen is flat.   Neurological:      Deep Tendon Reflexes:      Reflex Scores:       Tricep reflexes are 1+ on the right side and 1+ on the left side.       Bicep reflexes are 1+ on the right side and 1+ on the left side.       Brachioradialis reflexes are 1+ on the right side and 1+ on the left side.       Patellar reflexes are 1+ on the right side and 1+ on the left side.       Achilles reflexes are 1+ on the right side and 1+ on the left side.  Psychiatric:         Speech: Speech normal.          Neurological Exam  Mental Status  Awake, alert and oriented to person, place and time. Recent and remote memory are intact. Speech is normal. Language is fluent with no aphasia. Attention and concentration are normal.    Cranial Nerves  CN II: Visual fields full to confrontation.  CN III, IV, VI: Extraocular movements intact bilaterally.    Motor  Normal

## 2025-04-07 ENCOUNTER — OFFICE VISIT (OUTPATIENT)
Dept: RHEUMATOLOGY | Age: 75
End: 2025-04-07
Payer: MEDICARE

## 2025-04-07 VITALS
SYSTOLIC BLOOD PRESSURE: 122 MMHG | DIASTOLIC BLOOD PRESSURE: 74 MMHG | HEART RATE: 80 BPM | OXYGEN SATURATION: 98 % | HEIGHT: 62 IN | BODY MASS INDEX: 28.37 KG/M2 | WEIGHT: 154.2 LBS

## 2025-04-07 DIAGNOSIS — Z79.899 LONG-TERM USE OF HIGH-RISK MEDICATION: ICD-10-CM

## 2025-04-07 DIAGNOSIS — M85.88 OSTEOPENIA OF LUMBAR SPINE: Primary | ICD-10-CM

## 2025-04-07 DIAGNOSIS — M47.816 LOCALIZED OSTEOARTHRITIS OF LUMBAR SPINE: ICD-10-CM

## 2025-04-07 LAB
ALBUMIN SERPL-MCNC: 3.7 G/DL (ref 3.2–4.6)
ALBUMIN/GLOB SERPL: 1.1 (ref 1–1.9)
ALP SERPL-CCNC: 65 U/L (ref 35–104)
ALT SERPL-CCNC: 42 U/L (ref 8–45)
ANION GAP SERPL CALC-SCNC: 11 MMOL/L (ref 7–16)
AST SERPL-CCNC: 29 U/L (ref 15–37)
BILIRUB SERPL-MCNC: 0.3 MG/DL (ref 0–1.2)
BUN SERPL-MCNC: 16 MG/DL (ref 8–23)
CALCIUM SERPL-MCNC: 9.6 MG/DL (ref 8.8–10.2)
CHLORIDE SERPL-SCNC: 111 MMOL/L (ref 98–107)
CO2 SERPL-SCNC: 22 MMOL/L (ref 20–29)
CREAT SERPL-MCNC: 1.1 MG/DL (ref 0.6–1.1)
GLOBULIN SER CALC-MCNC: 3.3 G/DL (ref 2.3–3.5)
GLUCOSE SERPL-MCNC: 102 MG/DL (ref 70–99)
POTASSIUM SERPL-SCNC: 3.9 MMOL/L (ref 3.5–5.1)
PROT SERPL-MCNC: 6.9 G/DL (ref 6.3–8.2)
SODIUM SERPL-SCNC: 145 MMOL/L (ref 136–145)

## 2025-04-07 PROCEDURE — G8417 CALC BMI ABV UP PARAM F/U: HCPCS | Performed by: INTERNAL MEDICINE

## 2025-04-07 PROCEDURE — G8399 PT W/DXA RESULTS DOCUMENT: HCPCS | Performed by: INTERNAL MEDICINE

## 2025-04-07 PROCEDURE — 1123F ACP DISCUSS/DSCN MKR DOCD: CPT | Performed by: INTERNAL MEDICINE

## 2025-04-07 PROCEDURE — 1036F TOBACCO NON-USER: CPT | Performed by: INTERNAL MEDICINE

## 2025-04-07 PROCEDURE — 3017F COLORECTAL CA SCREEN DOC REV: CPT | Performed by: INTERNAL MEDICINE

## 2025-04-07 PROCEDURE — G8427 DOCREV CUR MEDS BY ELIG CLIN: HCPCS | Performed by: INTERNAL MEDICINE

## 2025-04-07 PROCEDURE — G2211 COMPLEX E/M VISIT ADD ON: HCPCS | Performed by: INTERNAL MEDICINE

## 2025-04-07 PROCEDURE — 1159F MED LIST DOCD IN RCRD: CPT | Performed by: INTERNAL MEDICINE

## 2025-04-07 PROCEDURE — 1160F RVW MEDS BY RX/DR IN RCRD: CPT | Performed by: INTERNAL MEDICINE

## 2025-04-07 PROCEDURE — 99214 OFFICE O/P EST MOD 30 MIN: CPT | Performed by: INTERNAL MEDICINE

## 2025-04-07 PROCEDURE — 1090F PRES/ABSN URINE INCON ASSESS: CPT | Performed by: INTERNAL MEDICINE

## 2025-04-07 ASSESSMENT — ROUTINE ASSESSMENT OF PATIENT INDEX DATA (RAPID3)
ON A SCALE OF ONE TO TEN, HOW DIFFICULT WAS IT FOR YOU TO COMPLETE THE LISTED DAILY PHYSICAL TASKS OVER THE LAST WEEK: 0.4
ON A SCALE OF ONE TO TEN, CONSIDERING ALL THE WAYS IN WHICH ILLNESS AND HEALTH CONDITIONS MAY AFFECT YOU AT THIS TIME, PLEASE INDICATE BELOW HOW YOU ARE DOING:: 1
ON A SCALE OF ONE TO TEN, HOW MUCH PAIN HAVE YOU HAD BECAUSE OF YOUR CONDITION OVER THE PAST WEEK?: 1
WHEN YOU AWAKENED IN THE MORNING OVER THE LAST WEEK, PLEASE INDICATE THE AMOUNT OF TIME IT TAKES UNTIL YOU ARE AS LIMBER AS YOU WILL BE FOR THE DAY: < 10 MIN
ON A SCALE OF ONE TO TEN, HOW MUCH OF A PROBLEM HAS UNUSUAL FATIGUE OR TIREDNESS BEEN FOR YOU OVER THE PAST WEEK?: 0

## 2025-04-07 NOTE — PROGRESS NOTES
Housing Stability: Not At Risk (3/12/2025)    Received from [facility]    Housing Stability     Was there a time when you did not have a steady place to sleep: No     Worried that the place you are staying is making you sick: No     Allergy:  Allergies   Allergen Reactions    Ciprofloxacin Diarrhea and Rash     Other reaction(s): Rash-Allergy  Other reaction(s): rash, diarrhea    Amoxicillin-Pot Clavulanate Diarrhea     Other reaction(s): Diarrhea-Intolerance     Current Medications:  Outpatient Encounter Medications as of 4/7/2025   Medication Sig Dispense Refill    topiramate (TOPAMAX) 100 MG tablet TAKE 1 AND 1/2 TABLETS BY MOUTH  TWICE DAILY 270 tablet 3    SUMAtriptan (IMITREX) 100 MG tablet Take 1 at the onset of your headache and you can repeat in 2 hours. 27 tablet 3    famotidine (PEPCID) 40 MG tablet Take 1 tablet by mouth daily      acetaminophen (TYLENOL) 500 MG tablet Take 1 tablet by mouth every 6 hours as needed for Pain      Multiple Vitamins-Minerals (CENTRUM ADULTS PO) Take 1 tablet by mouth daily      calcium carb-cholecalciferol 600-10 MG-MCG TABS per tab Take 1 tablet by mouth 2 times daily      RESTASIS 0.05 % ophthalmic emulsion       mirabegron (MYRBETRIQ) 25 MG TB24 Take by mouth daily      atorvastatin (LIPITOR) 20 MG tablet Take by mouth daily      Cholecalciferol 50 MCG (2000 UT) CAPS Take 1 capsule by mouth daily      denosumab (PROLIA) 60 MG/ML SOSY SC injection Inject 1 mL into the skin #9 on 11/04/24      esomeprazole (NEXIUM) 40 MG delayed release capsule Take 1 capsule by mouth daily      fexofenadine (ALLEGRA) 180 MG tablet Take by mouth daily      levothyroxine (SYNTHROID) 75 MCG tablet Takes 75 mcg by mouth Monday thru Saturday.      Probiotic Product (ACIDOPHILUS PROBIOTIC) CAPS capsule Take 1 capsule by mouth daily       No facility-administered encounter medications on file as of 4/7/2025.     REVIEW OF SYSTEMS: The following systems were reviewed with patient today and

## 2025-04-17 ENCOUNTER — TELEPHONE (OUTPATIENT)
Dept: RHEUMATOLOGY | Age: 75
End: 2025-04-17

## 2025-04-17 NOTE — TELEPHONE ENCOUNTER
----- Message from JERRY COLE MA sent at 4/16/2025 11:29 AM EDT -----  Regarding: prolia  Prolia due 5/4/25. Needs to be scheduled. Labs are done

## 2025-04-17 NOTE — TELEPHONE ENCOUNTER
Patient of Dr. Rhodes. Prolia due 5/4/25, last given 11/4/24. Last OV 4/7/25. Recommended continuation of Prolia.   CMP 4/7/25, vit D 11/5/25    PHONE CALL to patient to schedule the injection. Scheduled her for 5/5/25 10:30.

## 2025-05-05 ENCOUNTER — CLINICAL SUPPORT (OUTPATIENT)
Dept: RHEUMATOLOGY | Age: 75
End: 2025-05-05

## 2025-05-05 VITALS — TEMPERATURE: 97.3 F | HEART RATE: 79 BPM | DIASTOLIC BLOOD PRESSURE: 74 MMHG | SYSTOLIC BLOOD PRESSURE: 138 MMHG

## 2025-05-05 DIAGNOSIS — M81.0 AGE-RELATED OSTEOPOROSIS WITHOUT CURRENT PATHOLOGICAL FRACTURE: Primary | ICD-10-CM

## 2025-05-05 RX ORDER — HYDROCORTISONE SODIUM SUCCINATE 100 MG/2ML
100 INJECTION INTRAMUSCULAR; INTRAVENOUS
Status: DISCONTINUED | OUTPATIENT
Start: 2025-05-05 | End: 2025-05-05 | Stop reason: HOSPADM

## 2025-05-05 RX ORDER — ACETAMINOPHEN 325 MG/1
650 TABLET ORAL
Status: DISCONTINUED | OUTPATIENT
Start: 2025-05-05 | End: 2025-05-05 | Stop reason: HOSPADM

## 2025-05-05 RX ORDER — FAMOTIDINE 10 MG/ML
20 INJECTION, SOLUTION INTRAVENOUS
Status: DISCONTINUED | OUTPATIENT
Start: 2025-05-05 | End: 2025-05-05 | Stop reason: HOSPADM

## 2025-05-05 RX ORDER — ONDANSETRON 2 MG/ML
8 INJECTION INTRAMUSCULAR; INTRAVENOUS
Status: DISCONTINUED | OUTPATIENT
Start: 2025-05-05 | End: 2025-05-05 | Stop reason: HOSPADM

## 2025-05-05 RX ORDER — ALBUTEROL SULFATE 90 UG/1
4 INHALANT RESPIRATORY (INHALATION) PRN
Status: DISCONTINUED | OUTPATIENT
Start: 2025-05-05 | End: 2025-05-05 | Stop reason: HOSPADM

## 2025-05-05 RX ORDER — SODIUM CHLORIDE 9 MG/ML
INJECTION, SOLUTION INTRAVENOUS CONTINUOUS
Status: DISCONTINUED | OUTPATIENT
Start: 2025-05-05 | End: 2025-05-05 | Stop reason: HOSPADM

## 2025-05-05 RX ORDER — DIPHENHYDRAMINE HYDROCHLORIDE 50 MG/ML
50 INJECTION, SOLUTION INTRAMUSCULAR; INTRAVENOUS
Status: DISCONTINUED | OUTPATIENT
Start: 2025-05-05 | End: 2025-05-05 | Stop reason: HOSPADM

## 2025-05-05 RX ORDER — EPINEPHRINE 1 MG/ML
0.3 INJECTION, SOLUTION, CONCENTRATE INTRAVENOUS PRN
Status: DISCONTINUED | OUTPATIENT
Start: 2025-05-05 | End: 2025-05-05 | Stop reason: HOSPADM

## 2025-05-05 NOTE — PROGRESS NOTES
[facility] RHEUMATOLOGY  [address] [place] [address], [address]  [place] [address]  Office : [phone], Fax: [phone]       # 10 Prolia 60mg/ml injected SC today into left arm. Patient tolerated injection well. Advised patient to continue with calcium and vitamin D post injection. Advised patient to call with any problems post injection.   Medication ordered by Dr. Rhodes    Patient Medication Supplied? no  Patient discharged feeling well and instructed to call the office with any issues.    Pre-infusion/injection questionairre for osteoporosis    1. Have you had or are you planning any dental work? No    2. Are you taking your calcium and vitamin D? Yes    3. When was your last osteoporosis treatment? 11/4/24    4. Have you had any recent fractures? No    5. Are you currently in skilled nursing or in patient rehab? No